# Patient Record
Sex: FEMALE | Race: WHITE | NOT HISPANIC OR LATINO | URBAN - METROPOLITAN AREA
[De-identification: names, ages, dates, MRNs, and addresses within clinical notes are randomized per-mention and may not be internally consistent; named-entity substitution may affect disease eponyms.]

---

## 2017-11-15 ENCOUNTER — EMERGENCY (EMERGENCY)
Facility: HOSPITAL | Age: 57
LOS: 1 days | Discharge: HOME | End: 2017-11-15
Attending: EMERGENCY MEDICINE | Admitting: EMERGENCY MEDICINE
Payer: SELF-PAY

## 2017-11-15 VITALS
WEIGHT: 149.91 LBS | OXYGEN SATURATION: 100 % | DIASTOLIC BLOOD PRESSURE: 106 MMHG | HEIGHT: 59 IN | HEART RATE: 90 BPM | TEMPERATURE: 97 F | SYSTOLIC BLOOD PRESSURE: 166 MMHG | RESPIRATION RATE: 16 BRPM

## 2017-11-15 DIAGNOSIS — N93.9 ABNORMAL UTERINE AND VAGINAL BLEEDING, UNSPECIFIED: ICD-10-CM

## 2017-11-15 DIAGNOSIS — Z90.710 ACQUIRED ABSENCE OF BOTH CERVIX AND UTERUS: Chronic | ICD-10-CM

## 2017-11-15 LAB
ALBUMIN SERPL ELPH-MCNC: 4.1 G/DL — SIGNIFICANT CHANGE UP (ref 3.3–5.2)
ALP SERPL-CCNC: 88 U/L — SIGNIFICANT CHANGE UP (ref 40–120)
ALT FLD-CCNC: 42 U/L — HIGH
ANION GAP SERPL CALC-SCNC: 13 MMOL/L — SIGNIFICANT CHANGE UP (ref 5–17)
APPEARANCE UR: CLEAR — SIGNIFICANT CHANGE UP
AST SERPL-CCNC: 36 U/L — HIGH
BACTERIA # UR AUTO: ABNORMAL
BASOPHILS # BLD AUTO: 0.1 K/UL — SIGNIFICANT CHANGE UP (ref 0–0.2)
BASOPHILS NFR BLD AUTO: 1 % — SIGNIFICANT CHANGE UP (ref 0–2)
BILIRUB SERPL-MCNC: 0.5 MG/DL — SIGNIFICANT CHANGE UP (ref 0.4–2)
BILIRUB UR-MCNC: NEGATIVE — SIGNIFICANT CHANGE UP
BUN SERPL-MCNC: 15 MG/DL — SIGNIFICANT CHANGE UP (ref 8–20)
CALCIUM SERPL-MCNC: 9.4 MG/DL — SIGNIFICANT CHANGE UP (ref 8.6–10.2)
CHLORIDE SERPL-SCNC: 101 MMOL/L — SIGNIFICANT CHANGE UP (ref 98–107)
CO2 SERPL-SCNC: 24 MMOL/L — SIGNIFICANT CHANGE UP (ref 22–29)
COLOR SPEC: YELLOW — SIGNIFICANT CHANGE UP
CREAT SERPL-MCNC: 0.73 MG/DL — SIGNIFICANT CHANGE UP (ref 0.5–1.3)
DIFF PNL FLD: ABNORMAL
EOSINOPHIL # BLD AUTO: 0.4 K/UL — SIGNIFICANT CHANGE UP (ref 0–0.5)
EOSINOPHIL NFR BLD AUTO: 6.5 % — HIGH (ref 0–6)
EPI CELLS # UR: SIGNIFICANT CHANGE UP
GLUCOSE SERPL-MCNC: 105 MG/DL — SIGNIFICANT CHANGE UP (ref 70–115)
GLUCOSE UR QL: NEGATIVE MG/DL — SIGNIFICANT CHANGE UP
HCT VFR BLD CALC: 41.2 % — SIGNIFICANT CHANGE UP (ref 37–47)
HGB BLD-MCNC: 13.6 G/DL — SIGNIFICANT CHANGE UP (ref 12–16)
KETONES UR-MCNC: ABNORMAL
LEUKOCYTE ESTERASE UR-ACNC: ABNORMAL
LYMPHOCYTES # BLD AUTO: 1.8 K/UL — SIGNIFICANT CHANGE UP (ref 1–4.8)
LYMPHOCYTES # BLD AUTO: 28.2 % — SIGNIFICANT CHANGE UP (ref 20–55)
MCHC RBC-ENTMCNC: 30.2 PG — SIGNIFICANT CHANGE UP (ref 27–31)
MCHC RBC-ENTMCNC: 33 G/DL — SIGNIFICANT CHANGE UP (ref 32–36)
MCV RBC AUTO: 91.4 FL — SIGNIFICANT CHANGE UP (ref 81–99)
MONOCYTES # BLD AUTO: 0.8 K/UL — SIGNIFICANT CHANGE UP (ref 0–0.8)
MONOCYTES NFR BLD AUTO: 13.2 % — HIGH (ref 3–10)
NEUTROPHILS # BLD AUTO: 3.2 K/UL — SIGNIFICANT CHANGE UP (ref 1.8–8)
NEUTROPHILS NFR BLD AUTO: 50.9 % — SIGNIFICANT CHANGE UP (ref 37–73)
NITRITE UR-MCNC: NEGATIVE — SIGNIFICANT CHANGE UP
PH UR: 5 — SIGNIFICANT CHANGE UP (ref 5–8)
PLATELET # BLD AUTO: 249 K/UL — SIGNIFICANT CHANGE UP (ref 150–400)
POTASSIUM SERPL-MCNC: 4.1 MMOL/L — SIGNIFICANT CHANGE UP (ref 3.5–5.3)
POTASSIUM SERPL-SCNC: 4.1 MMOL/L — SIGNIFICANT CHANGE UP (ref 3.5–5.3)
PROT SERPL-MCNC: 7.9 G/DL — SIGNIFICANT CHANGE UP (ref 6.6–8.7)
PROT UR-MCNC: 30 MG/DL
RBC # BLD: 4.51 M/UL — SIGNIFICANT CHANGE UP (ref 4.4–5.2)
RBC # FLD: 12.9 % — SIGNIFICANT CHANGE UP (ref 11–15.6)
SODIUM SERPL-SCNC: 138 MMOL/L — SIGNIFICANT CHANGE UP (ref 135–145)
SP GR SPEC: 1.02 — SIGNIFICANT CHANGE UP (ref 1.01–1.02)
UROBILINOGEN FLD QL: 1 MG/DL
WBC # BLD: 6.3 K/UL — SIGNIFICANT CHANGE UP (ref 4.8–10.8)
WBC # FLD AUTO: 6.3 K/UL — SIGNIFICANT CHANGE UP (ref 4.8–10.8)
WBC UR QL: SIGNIFICANT CHANGE UP

## 2017-11-15 PROCEDURE — 80053 COMPREHEN METABOLIC PANEL: CPT

## 2017-11-15 PROCEDURE — 36415 COLL VENOUS BLD VENIPUNCTURE: CPT

## 2017-11-15 PROCEDURE — 81001 URINALYSIS AUTO W/SCOPE: CPT

## 2017-11-15 PROCEDURE — 99284 EMERGENCY DEPT VISIT MOD MDM: CPT

## 2017-11-15 PROCEDURE — 85027 COMPLETE CBC AUTOMATED: CPT

## 2017-11-15 RX ORDER — SODIUM CHLORIDE 9 MG/ML
3 INJECTION INTRAMUSCULAR; INTRAVENOUS; SUBCUTANEOUS ONCE
Qty: 0 | Refills: 0 | Status: COMPLETED | OUTPATIENT
Start: 2017-11-15 | End: 2017-11-15

## 2017-11-15 RX ADMIN — SODIUM CHLORIDE 3 MILLILITER(S): 9 INJECTION INTRAMUSCULAR; INTRAVENOUS; SUBCUTANEOUS at 10:19

## 2017-11-15 RX ADMIN — Medication 0.1 MILLIGRAM(S): at 10:18

## 2017-11-15 NOTE — CONSULT NOTE ADULT - ASSESSMENT
A 56y  LMP ; who presents with a chief complaint of vaginal spotting after sexual intercourse. Patient not actively bleeding

## 2017-11-15 NOTE — ED ADULT NURSE NOTE - OBJECTIVE STATEMENT
pt c/o vag bleeding after intercourse a few days ago bleeding resolved today also c/o lower abd pain x 1 yr. pt states she had a hysterectomy 5 yrs ago

## 2017-11-15 NOTE — ED STATDOCS - MEDICAL DECISION MAKING DETAILS
Pt will need a vaginal exam to look for cervical lesions vs. vaginal tear. UA, CBC, CMP, and Clonidine will be given for high BP.

## 2017-11-15 NOTE — ED STATDOCS - OBJECTIVE STATEMENT
56 year old female presenting to the ED complaining of vaginal bleeding, lower abdominal pain, wheezing, and HTN. Pt states that she is s/p hysterectomy 5 years ago. She states that her vaginal bleeding was light and onset after painful intercourse that last for a few days. Pt states that she visited an urgent care center for her vaginal bleeding and was found to have lower abdominal pain, HTN, and wheezing. She states that she last saw her GYN shortly after her hysterectomy. She states that she has no hx of DM. Pt states that she drinks alcohol and smokes 0.5 ppd. She states that she is allergic to Penicillin. No further complaints at this time.

## 2017-11-15 NOTE — ED STATDOCS - ATTENDING CONTRIBUTION TO CARE
I, Kulwant Sotelo, performed the initial face to face bedside interview with this patient regarding history of present illness, review of symptoms and relevant past medical, social and family history.  I completed an independent physical examination.  I was the initial provider who evaluated this patient. I have signed out the follow up of any pending tests (i.e. labs, radiological studies) to the ACP.  I have communicated the patient’s plan of care and disposition with the ACP.

## 2017-11-15 NOTE — CONSULT NOTE ADULT - SUBJECTIVE AND OBJECTIVE BOX
JUAN HOANG  A 56y  LMP ; who presents with a chief complaint of vaginal spotting after sexual intercourse on . This is a new sexual partner after she has not been sexually active for a number of years. Patient had a hysterectomy 5 years ago and did not see a gyn since then. She has been having pelvic pain since before the hysterectomy that has not resolved. The bleeding was not heavy and has stopped since. She denies any additional symptoms.     PAST MEDICAL & SURGICAL HISTORY:  No pertinent past medical history  S/P hysterectomy    Allergies: penicillin (Rash)    POB/GYN Hx: s/p hysterectomy 5 years ago     Vital Signs:  Vital Signs Last 24 Hrs  T(C): 36.3 (15 Nov 2017 09:12), Max: 36.3 (15 Nov 2017 09:12)  T(F): 97.4 (15 Nov 2017 09:12), Max: 97.4 (15 Nov 2017 09:12)  HR: 90 (15 Nov 2017 09:12) (90 - 90)  BP: 166/106 (15 Nov 2017 09:12) (166/106 - 166/106)  RR: 16 (15 Nov 2017 09:12) (16 - 16)  SpO2: 100% (15 Nov 2017 09:12) (100% - 100%)    Physical Exam:  General: NAD  Abdomen: ND, soft, NT.  Pelvic: Normal external genitalia no lesions in vaginal canal, no pooling in vaginal vault, no bleeding, 5mm circular abrasion on cervix at 9 o'clock, not actively bleeding, os is closed, no discharge from cervical os. Negative CMT, negative adnexal tenderness.    Labs:                13.6   6.3   )-----------( 249      ( 15 Nov 2017 10:17 )             41.2

## 2017-11-15 NOTE — ED STATDOCS - PROGRESS NOTE DETAILS
Pelvic exam done: abrasion to cervix, no active bleeding, no CMT, no adenexal tenderness, no lesions. Patient re-evaluated c/o DUB s/p hysterectomy x 5 years ago by [facility], had intercourse with new partner, denies any h/o STD.  Abd soft NT ND.  Patient is active smoker. GYN called for evaluation, saw patient and discussed with patient would like f/u as outpatient

## 2017-11-15 NOTE — ED ADULT TRIAGE NOTE - CHIEF COMPLAINT QUOTE
pt presents to Ed with vaginal bleeding x few days with lower abd pain. pt c.o SOB at times x few months. pt c/o wheezing. breathing si even and unlabored. pt also c/o lower back pain x over one year. pt ambulates without difficulty. afebrile.

## 2018-01-25 ENCOUNTER — EMERGENCY (EMERGENCY)
Facility: HOSPITAL | Age: 58
LOS: 1 days | Discharge: HOME | End: 2018-01-25
Attending: EMERGENCY MEDICINE | Admitting: EMERGENCY MEDICINE
Payer: SELF-PAY

## 2018-01-25 VITALS
HEIGHT: 59 IN | HEART RATE: 85 BPM | DIASTOLIC BLOOD PRESSURE: 98 MMHG | RESPIRATION RATE: 20 BRPM | SYSTOLIC BLOOD PRESSURE: 159 MMHG | OXYGEN SATURATION: 98 % | WEIGHT: 149.91 LBS | TEMPERATURE: 98 F

## 2018-01-25 DIAGNOSIS — Z90.710 ACQUIRED ABSENCE OF BOTH CERVIX AND UTERUS: Chronic | ICD-10-CM

## 2018-01-25 PROCEDURE — 96372 THER/PROPH/DIAG INJ SC/IM: CPT

## 2018-01-25 PROCEDURE — 99284 EMERGENCY DEPT VISIT MOD MDM: CPT

## 2018-01-25 PROCEDURE — 71101 X-RAY EXAM UNILAT RIBS/CHEST: CPT | Mod: 26

## 2018-01-25 PROCEDURE — 71101 X-RAY EXAM UNILAT RIBS/CHEST: CPT

## 2018-01-25 PROCEDURE — 99283 EMERGENCY DEPT VISIT LOW MDM: CPT | Mod: 25

## 2018-01-25 RX ORDER — TRAMADOL HYDROCHLORIDE 50 MG/1
1 TABLET ORAL
Qty: 12 | Refills: 0
Start: 2018-01-25 | End: 2018-01-27

## 2018-01-25 RX ORDER — METHOCARBAMOL 500 MG/1
2 TABLET, FILM COATED ORAL
Qty: 30 | Refills: 0
Start: 2018-01-25 | End: 2018-01-29

## 2018-01-25 RX ORDER — METHOCARBAMOL 500 MG/1
1500 TABLET, FILM COATED ORAL ONCE
Qty: 0 | Refills: 0 | Status: COMPLETED | OUTPATIENT
Start: 2018-01-25 | End: 2018-01-25

## 2018-01-25 RX ORDER — FAMOTIDINE 10 MG/ML
1 INJECTION INTRAVENOUS
Qty: 30 | Refills: 0
Start: 2018-01-25 | End: 2018-02-23

## 2018-01-25 RX ORDER — IBUPROFEN 200 MG
1 TABLET ORAL
Qty: 21 | Refills: 0
Start: 2018-01-25 | End: 2018-01-31

## 2018-01-25 RX ORDER — KETOROLAC TROMETHAMINE 30 MG/ML
60 SYRINGE (ML) INJECTION ONCE
Qty: 0 | Refills: 0 | Status: DISCONTINUED | OUTPATIENT
Start: 2018-01-25 | End: 2018-01-25

## 2018-01-25 RX ADMIN — METHOCARBAMOL 1500 MILLIGRAM(S): 500 TABLET, FILM COATED ORAL at 16:01

## 2018-01-25 RX ADMIN — Medication 60 MILLIGRAM(S): at 16:01

## 2018-01-25 NOTE — ED ADULT NURSE NOTE - OBJECTIVE STATEMENT
Pt axox3 c/o new onset of left sided rib/ chest pain that started last night. Pt denies any recent falls, heavy lifting or injury and is also c/o pain with inhalation. Pt denies sob, and at this time Is limited in mobility due to discomfort.

## 2018-01-25 NOTE — ED PROVIDER NOTE - ATTENDING CONTRIBUTION TO CARE
seen with acp  c/o localized left lower rib pain no hx of trauma no sob no nausea no vomiitng  PE localized tender left lower ribs chest xlear heart regular rrhythm  abd soft ext no cce  x-ray shows fracture ribs  Agree with ACPS, assessment hx and physical

## 2018-01-25 NOTE — ED PROVIDER NOTE - CARE PLAN
Principal Discharge DX:	Musculoskeletal pain  Secondary Diagnosis:	Hiatal hernia Principal Discharge DX:	Rib fracture  Secondary Diagnosis:	Hiatal hernia

## 2018-01-25 NOTE — ED PROVIDER NOTE - PROGRESS NOTE DETAILS
Hiatal hernia noted on CXR. No rib fx. Will dc home with pepcid and GI f/u and will also tx for msk pain

## 2018-01-25 NOTE — ED ADULT TRIAGE NOTE - CHIEF COMPLAINT QUOTE
pt c/o pain to under ribs on left side, pt denies injury, reports woke in pain, pain is with movement and palpation, pt reports a "fizzy ' feeling on right side under ribs but not pain, pt denies injury, breathing even and unlabored

## 2018-01-25 NOTE — ED PROVIDER NOTE - OBJECTIVE STATEMENT
58 yo F presented to ED to acute onset of left sided rib pain upon waking. Pt denies any trauma. Pain worse with movement. No CP or SOB. NO cough or sob. No N/V. NO rashes. 56 yo F presented to ED to acute onset of left sided rib pain upon waking. Pt denies any trauma. Pain worse with movement. No CP or SOB. NO cough or sob. No N/V. NO rashes. Pt also reports gurgling sensation in stomach.

## 2018-09-13 ENCOUNTER — EMERGENCY (EMERGENCY)
Facility: HOSPITAL | Age: 58
LOS: 1 days | Discharge: LEFT BEFORE DISCHARGE | End: 2018-09-13
Attending: EMERGENCY MEDICINE

## 2018-09-13 VITALS
RESPIRATION RATE: 18 BRPM | SYSTOLIC BLOOD PRESSURE: 144 MMHG | HEART RATE: 86 BPM | OXYGEN SATURATION: 95 % | DIASTOLIC BLOOD PRESSURE: 92 MMHG | TEMPERATURE: 98 F

## 2018-09-13 DIAGNOSIS — Z90.710 ACQUIRED ABSENCE OF BOTH CERVIX AND UTERUS: Chronic | ICD-10-CM

## 2019-08-08 NOTE — ED ADULT TRIAGE NOTE - NS ED NURSE BANDS TYPE
(gastroesophageal reflux disease)     Hiatal hernia     Hyperlipidemia     Hypertension     Obesity (BMI 30.0-34. 9)     YUMIKO on CPAP     PONV (postoperative nausea and vomiting)     nauseated after last surgery    Unspecified sleep apnea     cpap nightly       Family History:       Problem Relation Age of Onset    Heart Disease Mother        SURGICAL HISTORY:   Past Surgical History:   Procedure Laterality Date    COLONOSCOPY      ENDOSCOPY, COLON, DIAGNOSTIC      HERNIA REPAIR  2010    DC DESTR PENIS LESN,SIMPL,SURG EXCIS N/A 7/31/2018    SUPRA PUBIC LESIONS BIOPSY EXCISION performed by Eugenia Kincaid MD at [address] [place] [address] Bilateral 07/31/2018    SUPRA PUBIC LESIONS BIOPSY EXCISION (N/A )    THROAT SURGERY  2008    TUMOR REMOVAL Right 09/23/2016    [facility]  right thigh   (benign)              Not in a hospital admission. No Known Allergies  Social History     Tobacco Use   Smoking Status Never Smoker   Smokeless Tobacco Never Used     Prior to Admission medications    Medication Sig Start Date End Date Taking? Authorizing Provider   amLODIPine (NORVASC) 10 MG tablet TAKE ONE TABLET BY MOUTH EVERY DAY 7/2/19  Yes Edna Black MD   pravastatin (PRAVACHOL) 40 MG tablet TAKE ONE TABLET BY MOUTH EVERY DAY 6/5/19  Yes Edna Black MD   valsartan-hydrochlorothiazide (DIOVAN-HCT) 160-12.5 MG per tablet TAKE ONE TABLET BY MOUTH EVERY DAY 6/5/19  Yes Edna Black MD   potassium chloride (KLOR-CON M) 20 MEQ extended release tablet TAKE ONE TABLET BY MOUTH EVERY DAY. 6/5/19  Yes Edna Black MD   Fexofenadine-Pseudoephedrine (ALLEGRA-D 24 HOUR PO) Take 1 tablet by mouth daily as needed   Yes Historical Provider, MD         Physical Exam  General Appearance:    Alert, cooperative, no distress, appears stated age, Obesity [place]. Distress, very cooperative    Head:    Normocephalic, without obvious abnormality, atraumatic   Allergies no polyps. No sinus tenderness noted.     Throat examination is Name band;

## 2020-06-24 ENCOUNTER — TRANSCRIPTION ENCOUNTER (OUTPATIENT)
Age: 60
End: 2020-06-24

## 2020-07-08 ENCOUNTER — TRANSCRIPTION ENCOUNTER (OUTPATIENT)
Age: 60
End: 2020-07-08

## 2020-12-15 NOTE — ED PROVIDER NOTE - CPE EDP RESP NORM
What Type Of Note Output Would You Prefer (Optional)?: Bullet Format Hpi Title: Evaluation of Skin Lesions Have Your Spot(S) Been Treated In The Past?: has not been treated normal...

## 2021-11-27 ENCOUNTER — INPATIENT (INPATIENT)
Facility: HOSPITAL | Age: 61
LOS: 1 days | Discharge: HOME | DRG: 310 | End: 2021-11-29
Attending: HOSPITALIST | Admitting: HOSPITALIST
Payer: MEDICAID

## 2021-11-27 VITALS
OXYGEN SATURATION: 95 % | DIASTOLIC BLOOD PRESSURE: 83 MMHG | HEART RATE: 114 BPM | RESPIRATION RATE: 18 BRPM | TEMPERATURE: 98 F | SYSTOLIC BLOOD PRESSURE: 125 MMHG | WEIGHT: 139.99 LBS | HEIGHT: 59 IN

## 2021-11-27 DIAGNOSIS — Z90.710 ACQUIRED ABSENCE OF BOTH CERVIX AND UTERUS: Chronic | ICD-10-CM

## 2021-11-27 LAB
ALBUMIN SERPL ELPH-MCNC: 3.8 G/DL — SIGNIFICANT CHANGE UP (ref 3.3–5.2)
ALP SERPL-CCNC: 75 U/L — SIGNIFICANT CHANGE UP (ref 40–120)
ALT FLD-CCNC: 19 U/L — SIGNIFICANT CHANGE UP
ANION GAP SERPL CALC-SCNC: 13 MMOL/L — SIGNIFICANT CHANGE UP (ref 5–17)
APTT BLD: 30.6 SEC — SIGNIFICANT CHANGE UP (ref 27.5–35.5)
AST SERPL-CCNC: 29 U/L — SIGNIFICANT CHANGE UP
BASOPHILS # BLD AUTO: 0.05 K/UL — SIGNIFICANT CHANGE UP (ref 0–0.2)
BASOPHILS NFR BLD AUTO: 0.7 % — SIGNIFICANT CHANGE UP (ref 0–2)
BILIRUB SERPL-MCNC: 0.2 MG/DL — LOW (ref 0.4–2)
BUN SERPL-MCNC: 15.1 MG/DL — SIGNIFICANT CHANGE UP (ref 8–20)
CALCIUM SERPL-MCNC: 9 MG/DL — SIGNIFICANT CHANGE UP (ref 8.6–10.2)
CHLORIDE SERPL-SCNC: 105 MMOL/L — SIGNIFICANT CHANGE UP (ref 98–107)
CO2 SERPL-SCNC: 21 MMOL/L — LOW (ref 22–29)
CREAT SERPL-MCNC: 0.83 MG/DL — SIGNIFICANT CHANGE UP (ref 0.5–1.3)
D DIMER BLD IA.RAPID-MCNC: <150 NG/ML DDU — SIGNIFICANT CHANGE UP
EOSINOPHIL # BLD AUTO: 0.4 K/UL — SIGNIFICANT CHANGE UP (ref 0–0.5)
EOSINOPHIL NFR BLD AUTO: 5.5 % — SIGNIFICANT CHANGE UP (ref 0–6)
GLUCOSE SERPL-MCNC: 83 MG/DL — SIGNIFICANT CHANGE UP (ref 70–99)
HCT VFR BLD CALC: 39.6 % — SIGNIFICANT CHANGE UP (ref 34.5–45)
HGB BLD-MCNC: 13.4 G/DL — SIGNIFICANT CHANGE UP (ref 11.5–15.5)
HIV 1 & 2 AB SERPL IA.RAPID: SIGNIFICANT CHANGE UP
IMM GRANULOCYTES NFR BLD AUTO: 0.3 % — SIGNIFICANT CHANGE UP (ref 0–1.5)
INR BLD: 1.01 RATIO — SIGNIFICANT CHANGE UP (ref 0.88–1.16)
LYMPHOCYTES # BLD AUTO: 1.74 K/UL — SIGNIFICANT CHANGE UP (ref 1–3.3)
LYMPHOCYTES # BLD AUTO: 23.8 % — SIGNIFICANT CHANGE UP (ref 13–44)
MAGNESIUM SERPL-MCNC: 1.9 MG/DL — SIGNIFICANT CHANGE UP (ref 1.6–2.6)
MCHC RBC-ENTMCNC: 29.8 PG — SIGNIFICANT CHANGE UP (ref 27–34)
MCHC RBC-ENTMCNC: 33.8 GM/DL — SIGNIFICANT CHANGE UP (ref 32–36)
MCV RBC AUTO: 88.2 FL — SIGNIFICANT CHANGE UP (ref 80–100)
MONOCYTES # BLD AUTO: 0.83 K/UL — SIGNIFICANT CHANGE UP (ref 0–0.9)
MONOCYTES NFR BLD AUTO: 11.3 % — SIGNIFICANT CHANGE UP (ref 2–14)
NEUTROPHILS # BLD AUTO: 4.28 K/UL — SIGNIFICANT CHANGE UP (ref 1.8–7.4)
NEUTROPHILS NFR BLD AUTO: 58.4 % — SIGNIFICANT CHANGE UP (ref 43–77)
NT-PROBNP SERPL-SCNC: 232 PG/ML — SIGNIFICANT CHANGE UP (ref 0–300)
PLATELET # BLD AUTO: 197 K/UL — SIGNIFICANT CHANGE UP (ref 150–400)
POTASSIUM SERPL-MCNC: 4.3 MMOL/L — SIGNIFICANT CHANGE UP (ref 3.5–5.3)
POTASSIUM SERPL-SCNC: 4.3 MMOL/L — SIGNIFICANT CHANGE UP (ref 3.5–5.3)
PROT SERPL-MCNC: 7.8 G/DL — SIGNIFICANT CHANGE UP (ref 6.6–8.7)
PROTHROM AB SERPL-ACNC: 11.7 SEC — SIGNIFICANT CHANGE UP (ref 10.6–13.6)
RBC # BLD: 4.49 M/UL — SIGNIFICANT CHANGE UP (ref 3.8–5.2)
RBC # FLD: 12.7 % — SIGNIFICANT CHANGE UP (ref 10.3–14.5)
SODIUM SERPL-SCNC: 139 MMOL/L — SIGNIFICANT CHANGE UP (ref 135–145)
T4 AB SER-ACNC: 4.5 UG/DL — SIGNIFICANT CHANGE UP (ref 4.5–12)
TROPONIN T SERPL-MCNC: <0.01 NG/ML — SIGNIFICANT CHANGE UP (ref 0–0.06)
TSH SERPL-MCNC: 5.53 UIU/ML — HIGH (ref 0.27–4.2)
WBC # BLD: 7.32 K/UL — SIGNIFICANT CHANGE UP (ref 3.8–10.5)
WBC # FLD AUTO: 7.32 K/UL — SIGNIFICANT CHANGE UP (ref 3.8–10.5)

## 2021-11-27 PROCEDURE — 99285 EMERGENCY DEPT VISIT HI MDM: CPT | Mod: 25

## 2021-11-27 PROCEDURE — 71045 X-RAY EXAM CHEST 1 VIEW: CPT | Mod: 26

## 2021-11-27 PROCEDURE — 93010 ELECTROCARDIOGRAM REPORT: CPT | Mod: 76

## 2021-11-27 RX ORDER — DILTIAZEM HCL 120 MG
10 CAPSULE, EXT RELEASE 24 HR ORAL ONCE
Refills: 0 | Status: COMPLETED | OUTPATIENT
Start: 2021-11-27 | End: 2021-11-27

## 2021-11-27 RX ORDER — DILTIAZEM HCL 120 MG
30 CAPSULE, EXT RELEASE 24 HR ORAL ONCE
Refills: 0 | Status: COMPLETED | OUTPATIENT
Start: 2021-11-27 | End: 2021-11-27

## 2021-11-27 RX ORDER — ASPIRIN/CALCIUM CARB/MAGNESIUM 324 MG
324 TABLET ORAL ONCE
Refills: 0 | Status: COMPLETED | OUTPATIENT
Start: 2021-11-27 | End: 2021-11-27

## 2021-11-27 RX ADMIN — Medication 10 MILLIGRAM(S): at 23:59

## 2021-11-27 NOTE — ED ADULT NURSE NOTE - OBJECTIVE STATEMENT
pt with reports of palpitations and chest pressure that began yesterday. pt states she has been PAPPAS as well x a few weeks. denies cough, denies any other complaints. pt reports she has been smoking a lot more cigarettes recently the past few weeks as well. pt AOX3, no extremity edema noted. pt with no fevers, no recent illness.

## 2021-11-27 NOTE — ED PROVIDER NOTE - CLINICAL SUMMARY MEDICAL DECISION MAKING FREE TEXT BOX
labs and imaging reviewed.  Pt with palpitations and chest tightness.  initial EKG was mild ST depressions diffusely but resolved when she was less anxious and rate improved.  dimer neg.  Pt signed out to Dr. Murphy pending rpt trop and if neg plan to d/c with outpatient cards f/up. labs and imaging reviewed.  Pt with palpitations and chest tightness.    dimer neg. Pt had an episode of paroxysmal AF in ED.  Pt given IV and po cardizem. Pt signed out to Dr. Murphy monitoring and if HR stays stable plan for obs for cards consult/tte.

## 2021-11-27 NOTE — ED PROVIDER NOTE - CARE PROVIDER_API CALL
Garo Caro)  Cardiovascular Disease  [address] [facility], [address] [place] [address]  Phone: [phone]  Fax: [phone]  Follow Up Time: 1-3 Days

## 2021-11-27 NOTE — ED PROVIDER NOTE - OBJECTIVE STATEMENT
Pt is a 59 yo F co palpitations.  Pt states that last night she had onset of palpitations with some chest tightness.  Pt states that she has also had shortness of breath for the past month. Pt states that it has been constant but only occurs when she exerts herself.  Pt states that the palpitations and tightness went away overnight but came back again this evening. no n/v. no fever/chills. no cough. Pt is a 59 yo F co palpitations.  Pt states that last night she had onset of palpitations with some chest tightness.  Pt states that she has also had shortness of breath for the past month. Pt states that it has been constant but only occurs when she walks around but states that she has been smoking much heavier for the past couple of months. Pt states that the palpitations and tightness went away overnight but came back again this evening. no n/v. no fever/chills. no cough.

## 2021-11-27 NOTE — ED PROVIDER NOTE - NS ED ROS FT
No fever/chills, No photophobia/eye pain/changes in vision, No ear pain/sore throat/dysphagia, + chest pain/palpitations, + SOB no cough/wheeze/stridor, No abdominal pain, No N/V/D, no dysuria/frequency/discharge, No neck/back pain, no rash, no changes in neurological status/function.

## 2021-11-28 DIAGNOSIS — E03.9 HYPOTHYROIDISM, UNSPECIFIED: ICD-10-CM

## 2021-11-28 DIAGNOSIS — F17.200 NICOTINE DEPENDENCE, UNSPECIFIED, UNCOMPLICATED: ICD-10-CM

## 2021-11-28 DIAGNOSIS — S82.202A UNSPECIFIED FRACTURE OF SHAFT OF LEFT TIBIA, INITIAL ENCOUNTER FOR CLOSED FRACTURE: Chronic | ICD-10-CM

## 2021-11-28 DIAGNOSIS — I48.0 PAROXYSMAL ATRIAL FIBRILLATION: ICD-10-CM

## 2021-11-28 DIAGNOSIS — I48.91 UNSPECIFIED ATRIAL FIBRILLATION: ICD-10-CM

## 2021-11-28 DIAGNOSIS — F41.9 ANXIETY DISORDER, UNSPECIFIED: ICD-10-CM

## 2021-11-28 LAB
SARS-COV-2 RNA SPEC QL NAA+PROBE: SIGNIFICANT CHANGE UP
TROPONIN T SERPL-MCNC: <0.01 NG/ML — SIGNIFICANT CHANGE UP (ref 0–0.06)
TROPONIN T SERPL-MCNC: <0.01 NG/ML — SIGNIFICANT CHANGE UP (ref 0–0.06)

## 2021-11-28 PROCEDURE — 99236 HOSP IP/OBS SAME DATE HI 85: CPT | Mod: 25

## 2021-11-28 PROCEDURE — 99223 1ST HOSP IP/OBS HIGH 75: CPT

## 2021-11-28 PROCEDURE — 93010 ELECTROCARDIOGRAM REPORT: CPT

## 2021-11-28 PROCEDURE — 99222 1ST HOSP IP/OBS MODERATE 55: CPT

## 2021-11-28 PROCEDURE — 71275 CT ANGIOGRAPHY CHEST: CPT | Mod: 26,MA

## 2021-11-28 RX ORDER — ALBUTEROL 90 UG/1
2.5 AEROSOL, METERED ORAL EVERY 6 HOURS
Refills: 0 | Status: DISCONTINUED | OUTPATIENT
Start: 2021-11-28 | End: 2021-11-29

## 2021-11-28 RX ORDER — SERTRALINE 25 MG/1
1 TABLET, FILM COATED ORAL
Qty: 0 | Refills: 0 | DISCHARGE

## 2021-11-28 RX ORDER — SERTRALINE 25 MG/1
25 TABLET, FILM COATED ORAL DAILY
Refills: 0 | Status: DISCONTINUED | OUTPATIENT
Start: 2021-11-28 | End: 2021-11-29

## 2021-11-28 RX ORDER — DILTIAZEM HCL 120 MG
30 CAPSULE, EXT RELEASE 24 HR ORAL EVERY 6 HOURS
Refills: 0 | Status: DISCONTINUED | OUTPATIENT
Start: 2021-11-28 | End: 2021-11-28

## 2021-11-28 RX ORDER — SODIUM CHLORIDE 9 MG/ML
1000 INJECTION, SOLUTION INTRAVENOUS
Refills: 0 | Status: DISCONTINUED | OUTPATIENT
Start: 2021-11-28 | End: 2021-11-29

## 2021-11-28 RX ORDER — ASPIRIN/CALCIUM CARB/MAGNESIUM 324 MG
81 TABLET ORAL DAILY
Refills: 0 | Status: DISCONTINUED | OUTPATIENT
Start: 2021-11-28 | End: 2021-11-29

## 2021-11-28 RX ORDER — INFLUENZA VIRUS VACCINE 15; 15; 15; 15 UG/.5ML; UG/.5ML; UG/.5ML; UG/.5ML
0.5 SUSPENSION INTRAMUSCULAR ONCE
Refills: 0 | Status: DISCONTINUED | OUTPATIENT
Start: 2021-11-28 | End: 2021-11-29

## 2021-11-28 RX ORDER — DIVALPROEX SODIUM 500 MG/1
1 TABLET, DELAYED RELEASE ORAL
Qty: 0 | Refills: 0 | DISCHARGE

## 2021-11-28 RX ORDER — LEVOTHYROXINE SODIUM 125 MCG
1 TABLET ORAL
Qty: 0 | Refills: 0 | DISCHARGE

## 2021-11-28 RX ORDER — DIVALPROEX SODIUM 500 MG/1
250 TABLET, DELAYED RELEASE ORAL EVERY 12 HOURS
Refills: 0 | Status: DISCONTINUED | OUTPATIENT
Start: 2021-11-28 | End: 2021-11-29

## 2021-11-28 RX ORDER — MAGNESIUM SULFATE 500 MG/ML
1 VIAL (ML) INJECTION ONCE
Refills: 0 | Status: COMPLETED | OUTPATIENT
Start: 2021-11-28 | End: 2021-11-28

## 2021-11-28 RX ORDER — LEVOTHYROXINE SODIUM 125 MCG
25 TABLET ORAL DAILY
Refills: 0 | Status: DISCONTINUED | OUTPATIENT
Start: 2021-11-28 | End: 2021-11-29

## 2021-11-28 RX ORDER — BUDESONIDE AND FORMOTEROL FUMARATE DIHYDRATE 160; 4.5 UG/1; UG/1
2 AEROSOL RESPIRATORY (INHALATION)
Refills: 0 | Status: DISCONTINUED | OUTPATIENT
Start: 2021-11-28 | End: 2021-11-29

## 2021-11-28 RX ORDER — QUETIAPINE FUMARATE 200 MG/1
200 TABLET, FILM COATED ORAL AT BEDTIME
Refills: 0 | Status: DISCONTINUED | OUTPATIENT
Start: 2021-11-28 | End: 2021-11-29

## 2021-11-28 RX ORDER — QUETIAPINE FUMARATE 200 MG/1
1 TABLET, FILM COATED ORAL
Qty: 0 | Refills: 0 | DISCHARGE

## 2021-11-28 RX ORDER — DILTIAZEM HCL 120 MG
60 CAPSULE, EXT RELEASE 24 HR ORAL EVERY 6 HOURS
Refills: 0 | Status: DISCONTINUED | OUTPATIENT
Start: 2021-11-28 | End: 2021-11-29

## 2021-11-28 RX ADMIN — Medication 100 GRAM(S): at 05:09

## 2021-11-28 RX ADMIN — QUETIAPINE FUMARATE 200 MILLIGRAM(S): 200 TABLET, FILM COATED ORAL at 21:04

## 2021-11-28 RX ADMIN — SODIUM CHLORIDE 125 MILLILITER(S): 9 INJECTION, SOLUTION INTRAVENOUS at 04:46

## 2021-11-28 RX ADMIN — DIVALPROEX SODIUM 250 MILLIGRAM(S): 500 TABLET, DELAYED RELEASE ORAL at 17:12

## 2021-11-28 RX ADMIN — Medication 60 MILLIGRAM(S): at 17:35

## 2021-11-28 RX ADMIN — Medication 81 MILLIGRAM(S): at 12:45

## 2021-11-28 RX ADMIN — SERTRALINE 25 MILLIGRAM(S): 25 TABLET, FILM COATED ORAL at 12:45

## 2021-11-28 RX ADMIN — Medication 60 MILLIGRAM(S): at 12:45

## 2021-11-28 RX ADMIN — Medication 30 MILLIGRAM(S): at 06:12

## 2021-11-28 RX ADMIN — Medication 324 MILLIGRAM(S): at 00:29

## 2021-11-28 RX ADMIN — DIVALPROEX SODIUM 250 MILLIGRAM(S): 500 TABLET, DELAYED RELEASE ORAL at 06:13

## 2021-11-28 RX ADMIN — Medication 30 MILLIGRAM(S): at 00:28

## 2021-11-28 RX ADMIN — Medication 1 GRAM(S): at 06:10

## 2021-11-28 NOTE — H&P ADULT - ASSESSMENT
60 year old female smoker with PMH Hypothyroidism, Anxiety with depression presents with palpitations and exertional dyspnea. Noted to have new onset AF with RVR. TSH slightly elevated. CT chest with SUSANNAH nodules.    AF with RVR, CHADSVASC 1  - monitored bed  - Cardizem 60 q6  - TTE  - Cardiology/EP consults appreciated    Smoking with emphysema on CT scan  - Albuterol, Symbicort  - Repeat CT scan in 8 weeks  - Smoking cessation; refused NRT    Depression/Anxety  - Continue home medications; Depakote, Sertraline and Seroquel    Hypothyroidism  - Continue Synthroid 25 for now  - Repeat TSH    VTEp - VCD  Incentive Spirometry

## 2021-11-28 NOTE — ED ADULT NURSE REASSESSMENT NOTE - GENERAL PATIENT STATE
Patient is alert and oriented x 3, breathing freely via room air with no sign of acute distress noted./comfortable appearance/cooperative
Patient is lying on stretcher, resting, but responses to verbal stimuli, breathing freely via room air with no sign of acute distress noted. Patient is in stable condition./comfortable appearance/cooperative
comfortable appearance/cooperative
comfortable appearance/cooperative
cooperative
comfortable appearance/cooperative

## 2021-11-28 NOTE — CONSULT NOTE ADULT - PROBLEM SELECTOR RECOMMENDATION 2
Patient educated on risks of continue tobacco use  - will consider cessation after discharge  - did not ask for Nicotine patch at this time

## 2021-11-28 NOTE — CONSULT NOTE ADULT - NSCONSULTADDITIONALINFOA_GEN_ALL_CORE
Pt seen and examined.  Plan of care BETTY DAVIS.  Pt with palpitations. Paroxysmal afib.  Pt on multiple meds for psychiatric issues.   Advised ED to get CTA chest to r/o pe.  Cont with cardizem  She may need antiarrhythmic therapy vs ablation.  I will ask EP to see pt as well.

## 2021-11-28 NOTE — ED ADULT NURSE REASSESSMENT NOTE - COMFORT CARE
ambulated to bathroom ambulated to bathroom/meal provided/plan of care explained/po fluids offered/repositioned/wait time explained

## 2021-11-28 NOTE — ED CDU PROVIDER SUBSEQUENT DAY NOTE - MEDICAL DECISION MAKING DETAILS
Pt is a 59 yo Female PMh of anxiety , depression co palpitations on and off x 1 month and she is feeling winded.  Pt states that last night she had onset of palpitations with some chest tightness. states she has some chest pain as well on mid chest none radiating x 1 month with new onset of the A fib on mon     given Cardizem Iv and Po covert to NSR   continue Cardizem 30MG PO Q6hrs with parameter   serial trop and EKG x 3 negative   Echo   called [facility]  LILLIAM SCORE IS 1 start the pt on ASA

## 2021-11-28 NOTE — ED ADULT NURSE REASSESSMENT NOTE - REASSESS COMMUNICATION
Report given to GONZÁLEZ Zavala from [facility].
DEVIKA Murphy MD made aware; stating patient is due to go to observation./ED physician notified
ED physician notified

## 2021-11-28 NOTE — ED CDU PROVIDER SUBSEQUENT DAY NOTE - ATTENDING CONTRIBUTION TO CARE
I personally saw the patient with the PA, and completed the key components of the history and physical exam. I then discussed the management plan with the PA.   gen in nad resp clear cardiac no mucmur irregular irregular abd soft neuro intact

## 2021-11-28 NOTE — CONSULT NOTE ADULT - SUBJECTIVE AND OBJECTIVE BOX
[place] [facility]                                                        Office: [address] [address]                                                       Telephone: [phone]. Fax:[phone]                                                              CARDIOLOGY CONSULTATION NOTE                                                                                               History obtained by: Patient and medical record     obtained: No    Chief complaint: i have palpitations     HPI: Patient is a 61yo F c/o palpitations.  Pt states that last night she had onset of palpitations with some chest tightness.  Pt states that she has also had shortness of breath for the past month. Pt states that it has been constant but only occurs when she walks around but states that she has been smoking much heavier for the past couple of months. Pt states that the palpitations and tightness went away overnight but came back again this evening. no n/v. no fever/chills. no cough    REVIEW OF SYMPTOMS:   Cardiovascular:  See HPI. No chest pain,  No dyspnea,  No syncope,  No palpitations, No dizziness, No Orthopnea,      No Paroxsymal nocturnal dyspnea;  Respiratory:  No Dyspnea, No cough,     Genitourinary:  No dysuria, no hematuria; Gastrointestinal:  No nausea, no vomiting. No diarrhea.  No abdominal pain. No dark color stool, no melena ; Neurological: No headache, no dizziness, no slurred speech;  Psychiatric: No agitation, no anxiety.  ALL OTHER REVIEW OF SYSTEMS ARE NEGATIVE.    ALLERGIES: Penicillin (Rash)    CURRENT MEDICATIONS:  diltiazem    Tablet 30 milliGRAM(s) Oral every 6 hours  diVALproex DR  sertraline  aspirin enteric coated  lactated ringers.  magnesium sulfate  IVPB    HOME MEDICATIONS:    PAST MEDICAL HISTORY  Sleeping difficulty  Major depression  Anxiety and depression    PAST SURGICAL HISTORY  S/P hysterectomy    FAMILY HISTORY: FH: CAD (coronary artery disease) (Mother)    SOCIAL HISTORY:  + smoking 0.5 pck for 45years. no alcohol/drugs    Vital Signs Last 24 Hrs  T(C): 36.8 (28 Nov 2021 04:00), Max: 36.8 (28 Nov 2021 04:00)  T(F): 98.2 (28 Nov 2021 04:00), Max: 98.2 (28 Nov 2021 04:00)  HR: 120 (28 Nov 2021 04:00) (79 - 140)  BP: 112/74 (28 Nov 2021 04:00) (106/79 - 125/83)  BP(mean): 88 (28 Nov 2021 00:48) (88 - 88)  RR: 18 (28 Nov 2021 04:00) (18 - 23)  SpO2: 98% (28 Nov 2021 04:00) (95% - 98%)      PHYSICAL EXAM:  Constitutional: Comfortable . No acute distress.   HEENT: Atraumatic and normcephalic , neck is supple . no JVD. No carotid bruit. PEERL   CNS: A&Ox3. No focal deficits. EOMI. Cranial nerves II-IX are intact.   Lymph Nodes: Cervical : Not palpable.  Respiratory: CTAB  Cardiovascular: S1S2 RRR. No murmur/rubs or gallop.  Gastrointestinal: Soft non-tender and non distended . +Bowel sounds. negative Dodd's sign.  Extremities: No edema.   Psychiatric: Calm . no agitation.  Skin: No skin rash/ulcers visualized to face, hands or feet.    Intake and output:     LABS:                        13.4   7.32  )-----------( 197      ( 27 Nov 2021 21:09 )             39.6     11-27    139  |  105  |  15.1  ----------------------------<  83  4.3   |  21.0<L>  |  0.83    Ca    9.0      27 Nov 2021 21:09  Mg     1.9     11-27    TPro  7.8  /  Alb  3.8  /  TBili  0.2<L>  /  DBili  x   /  AST  29  /  ALT  19  /  AlkPhos  75  11-27    CARDIAC MARKERS ( 28 Nov 2021 00:20 )  x     / <0.01 ng/mL / x     / x     / x      CARDIAC MARKERS ( 27 Nov 2021 21:09 )  x     / <0.01 ng/mL / x     / x     / x        ;p-BNP=Serum Pro-Brain Natriuretic Peptide: 232 pg/mL (11-27 @ 21:09)     PT: 11.7 sec;   INR: 1.01 ratio   PTT:30.6 sec    INTERPRETATION OF TELEMETRY: NSR w/ PVCs  ECG: A fib RVR    RADIOLOGY & ADDITIONAL STUDIES:   X-ray:      ECHO FINDINGS: Date:                                                                       [place] [facility]                                                        Office: [address] [address]                                                       Telephone: [phone]. Fax:[phone]                                                              CARDIOLOGY CONSULTATION NOTE                                                                                               History obtained by: Patient and medical record     obtained: No    Chief complaint: i have palpitations     HPI: Patient is a 61yo F c/o palpitations.  Patient reports worsening and more frequent episodes of rapid heart rate and last night she experienced palpitations with some chest tightness as well.  Also has some shortness of breath lately and admits to "smoking much heavier for the past couple of months."  Smokes about half a pack daily for past 45 years, denies ETOH or drug use.  States palpitations come on randomly, sometimes at rest other times while she moves around.  Events last a few seconds up to a minute, and associated with chest tightness.  Denies HA, N/V, diaphoresis, sick contacts, fever/chills. or cough.  Family history of arrhythmia.     REVIEW OF SYMPTOMS:   Cardiovascular: See HPI.  +chest tightness, no dyspnea, no syncope, +palpitations, no dizziness, no Orthopnea,      No Paroxsymal nocturnal dyspnea;    Respiratory: No Dyspnea, No cough,     Genitourinary: No dysuria, no hematuria;   Gastrointestinal: No nausea, no vomiting. No diarrhea, No abdominal pain. No dark color stool, no melena; Neurological: No headache, no dizziness, no slurred speech;    Psychiatric: No agitation, + anxiety.  ALL OTHER REVIEW OF SYSTEMS ARE NEGATIVE.    ALLERGIES: Penicillin (Rash)    CURRENT MEDICATIONS:  diltiazem    Tablet 30 milliGRAM(s) Oral every 6 hours  diVALproex DR  sertraline  aspirin enteric coated  lactated ringers.  magnesium sulfate  IVPB    HOME MEDICATIONS:  diVALproex DR  sertraline    PAST MEDICAL HISTORY  Sleeping difficulty  Major depression  Anxiety and depression    PAST SURGICAL HISTORY  S/P hysterectomy    FAMILY HISTORY: FH: CAD (coronary artery disease) (Mother)    SOCIAL HISTORY:  + smoking 0.5 pck for 45years. no alcohol/drugs    Vital Signs Last 24 Hrs  T(C): 36.8 (28 Nov 2021 04:00), Max: 36.8 (28 Nov 2021 04:00)  T(F): 98.2 (28 Nov 2021 04:00), Max: 98.2 (28 Nov 2021 04:00)  HR: 120 (28 Nov 2021 04:00) (79 - 140)  BP: 112/74 (28 Nov 2021 04:00) (106/79 - 125/83)  BP(mean): 88 (28 Nov 2021 00:48) (88 - 88)  RR: 18 (28 Nov 2021 04:00) (18 - 23)  SpO2: 98% (28 Nov 2021 04:00) (95% - 98%)    PHYSICAL EXAM:  Constitutional: mild distress due to new medical condition   HEENT: Atraumatic, EIOMI, neck is supple, no JVD   CNS: A&Ox3. No focal motor deficits, sensory intact   Respiratory: CTA b/l  Cardiovascular: S1S2 RRR. No murmur/rubs  Gastrointestinal: Soft non-tender, non distended, +Bowel sounds  Extremities: No edema   Psychiatric: + anxious   Skin: + tattoos on LEs, no skin rash/ulcers visualized to face, hands or feet    Intake and output:     LABS:                        13.4   7.32  )-----------( 197      ( 27 Nov 2021 21:09 )             39.6     11-27    139  |  105  |  15.1  ----------------------------<  83  4.3   |  21.0<L>  |  0.83    Ca    9.0      27 Nov 2021 21:09  Mg     1.9     11-27    TPro  7.8  /  Alb  3.8  /  TBili  0.2<L>  /  DBili  x   /  AST  29  /  ALT  19  /  AlkPhos  75  11-27    CARDIAC MARKERS ( 28 Nov 2021 00:20 )  x     / <0.01 ng/mL / x     / x     / x      CARDIAC MARKERS ( 27 Nov 2021 21:09 )  x     / <0.01 ng/mL / x     / x     / x        ;p-BNP=Serum Pro-Brain Natriuretic Peptide: 232 pg/mL (11-27 @ 21:09)     PT: 11.7 sec;   INR: 1.01 ratio   PTT:30.6 sec    INTERPRETATION OF TELEMETRY: NSR w/ PVCs  ECG: A fib RVR    RADIOLOGY & ADDITIONAL STUDIES:   Preliminary X-ray: Clear    ECHO FINDINGS: Date:                                                                       [place] [facility]                                                        Office: [address] [address]                                                       Telephone: [phone]. Fax:[phone]                                                              CARDIOLOGY CONSULTATION NOTE                                                                                               History obtained by: Patient and medical record     obtained: No    Chief complaint: i have palpitations     HPI: Patient is a 61yo F c/o palpitations.  Patient reports worsening and more frequent episodes of rapid heart rate and last night she experienced palpitations with some chest tightness as well.  Also has some shortness of breath lately and admits to "smoking much heavier for the past couple of months."  Smokes about half a pack daily for past 45 years, denies ETOH or drug use.  States palpitations come on randomly, sometimes at rest other times while she moves around.  Events last a few seconds up to a minute, and associated with chest tightness.  Denies HA, N/V, diaphoresis, sick contacts, fever/chills. or cough.  Family history of arrhythmia.     REVIEW OF SYMPTOMS:   Cardiovascular: See HPI.  +chest tightness, no dyspnea, no syncope, +palpitations, no dizziness, no Orthopnea,      No Paroxsymal nocturnal dyspnea;    Respiratory: No Dyspnea, No cough,     Genitourinary: No dysuria, no hematuria;   Gastrointestinal: No nausea, no vomiting. No diarrhea, No abdominal pain. No dark color stool, no melena; Neurological: No headache, no dizziness, no slurred speech;    Psychiatric: No agitation, + anxiety.  ALL OTHER REVIEW OF SYSTEMS ARE NEGATIVE.    ALLERGIES: Penicillin (Rash)    CURRENT MEDICATIONS:  diltiazem    Tablet 30 milliGRAM(s) Oral every 6 hours  diVALproex DR  sertraline  aspirin enteric coated  lactated ringers.  magnesium sulfate  IVPB    HOME MEDICATIONS:  diVALproex DR  sertraline    PAST MEDICAL HISTORY  Sleeping difficulty  Major depression  Anxiety and depression    PAST SURGICAL HISTORY  S/P hysterectomy    FAMILY HISTORY: FH: CAD (coronary artery disease) (Mother)    SOCIAL HISTORY:  + smoking 0.5 pck for 45years. no alcohol/drugs    Vital Signs Last 24 Hrs  T(C): 36.8 (28 Nov 2021 04:00), Max: 36.8 (28 Nov 2021 04:00)  T(F): 98.2 (28 Nov 2021 04:00), Max: 98.2 (28 Nov 2021 04:00)  HR: 120 (28 Nov 2021 04:00) (79 - 140)  BP: 112/74 (28 Nov 2021 04:00) (106/79 - 125/83)  BP(mean): 88 (28 Nov 2021 00:48) (88 - 88)  RR: 18 (28 Nov 2021 04:00) (18 - 23)  SpO2: 98% (28 Nov 2021 04:00) (95% - 98%)    PHYSICAL EXAM:  Constitutional: mild distress due to new medical condition   HEENT: Atraumatic, EIOMI, neck is supple, no JVD   CNS: A&Ox3. No focal motor deficits, sensory intact   Respiratory: CTA b/l  Cardiovascular: S1S2 RRR. No murmur/rubs  Gastrointestinal: Soft non-tender, non distended, +Bowel sounds  Extremities: No edema   Psychiatric: + anxious   Skin: + tattoos on LEs, no skin rash/ulcers visualized to face, hands or feet    Intake and output:     LABS:                        13.4   7.32  )-----------( 197      ( 27 Nov 2021 21:09 )             39.6     11-27    139  |  105  |  15.1  ----------------------------<  83  4.3   |  21.0<L>  |  0.83    Ca    9.0      27 Nov 2021 21:09  Mg     1.9     11-27    TPro  7.8  /  Alb  3.8  /  TBili  0.2<L>  /  DBili  x   /  AST  29  /  ALT  19  /  AlkPhos  75  11-27    CARDIAC MARKERS ( 28 Nov 2021 00:20 )  x     / <0.01 ng/mL / x     / x     / x      CARDIAC MARKERS ( 27 Nov 2021 21:09 )  x     / <0.01 ng/mL / x     / x     / x      ;p-BNP=Serum Pro-Brain Natriuretic Peptide: 232 pg/mL (11-27 @ 21:09)     PT: 11.7 sec;   INR: 1.01 ratio   PTT:30.6 sec    INTERPRETATION OF TELEMETRY: NSR w/ PVCs  ECG: A fib RVR    RADIOLOGY & ADDITIONAL STUDIES:   Preliminary X-ray: Clear    ECHO FINDINGS: Date:

## 2021-11-28 NOTE — CONSULT NOTE ADULT - SUBJECTIVE AND OBJECTIVE BOX
Electrophysiology Attending Consult Note    HPI:  This is a 60 years old woman with h/o overweight (BMI 28), anxiety/depression on meds, hysterectomy and foot surgery. She has remote h/o alcohol abuse, stopped 5 years ago, and is an active smoker with > 40 ppd smoking history. She now presents with progressive PAPPAS, chest tightness and palpitation. She stated that for more than 5 months now she has been having PAPPAS and chest tightness with walking. Also has separate episodes of palpitation with rapid and irregular heart racing, that is a/w SOB. She has been having more frequent palpitation episodes, which sued to stop on their own in less than 1-2 hours. Yesterday she had a more severe and longer episode and so came to the ED. In the ED, found to have episodes of paroxysmal AFib with RVR. Started on Diltiazem 30 mg q 6 h, now up to 60 mg q 6hours.     ROS is also positive for wheeze with exertion. Denies any weight loss, loss of appetite, or night sweats.   CXR was abnormal and suspicious for right lung mass, CT done, see below     PAST MEDICAL & SURGICAL HISTORY:  Sleeping difficulty  Anxiety and depression  S/P hysterectomy      REVIEW OF SYSTEMS:    CONSTITUTIONAL: No fever, weight loss, or fatigue  EYES: No eye pain, visual disturbances, or discharge  ENMT:  No difficulty hearing, tinnitus, vertigo; No sinus or throat pain  NECK: No pain or stiffness  BREASTS: No pain, masses, or nipple discharge  RESPIRATORY: No cough, chills or hemoptysis. + Wheeze, and shortness of breath  CARDIOVASCULAR: + chest tightness and palpitations. Negative for dizziness, or leg swelling  GASTROINTESTINAL: No abdominal or epigastric pain. No nausea, vomiting, or hematemesis; No diarrhea or constipation. No melena or hematochezia.  GENITOURINARY: No dysuria, frequency, hematuria, or incontinence  NEUROLOGICAL: No headaches, memory loss, loss of strength, numbness, or tremors  SKIN: No itching, burning, rashes, or lesions   LYMPH NODES: No enlarged glands  ENDOCRINE: No heat or cold intolerance; No hair loss  MUSCULOSKELETAL: No joint pain or swelling; No muscle, back, or extremity pain  HEME/LYMPH: No easy bruising, or bleeding gums  ALLERY AND IMMUNOLOGIC: No hives or eczema      MEDICATIONS  (STANDING):  aspirin enteric coated 81 milliGRAM(s) Oral daily  diltiazem    Tablet 60 milliGRAM(s) Oral every 6 hours  diVALproex  milliGRAM(s) Oral every 12 hours  lactated ringers. 1000 milliLiter(s) (125 mL/Hr) IV Continuous <Continuous>  sertraline 25 milliGRAM(s) Oral daily    MEDICATIONS  (PRN):  QUEtiapine 200 milliGRAM(s) Oral at bedtime PRN sleep      Allergies    penicillin (Rash)    Intolerances        SOCIAL HISTORY:  - Lives alone, works for [employer]  - Active smoker, > 40-45 years  - H/o excessive alcohol abuse, sober for 5 years    FAMILY HISTORY:  - Negative for premature CAD, or SCD, PPM/ICD or transplant  FH: CAD (coronary artery disease) (Mother)        Vital Signs Last 24 Hrs  T(C): 36.9 (28 Nov 2021 12:52), Max: 36.9 (28 Nov 2021 12:52)  T(F): 98.4 (28 Nov 2021 12:52), Max: 98.4 (28 Nov 2021 12:52)  HR: 79 (28 Nov 2021 12:52) (67 - 140)  BP: 108/72 (28 Nov 2021 12:52) (106/79 - 125/83)  BP(mean): 88 (28 Nov 2021 00:48) (88 - 88)  RR: 18 (28 Nov 2021 12:52) (18 - 23)  SpO2: 94% (28 Nov 2021 12:52) (94% - 98%)    Physical Exam:  Constitutional: AAOx3, NAD  Neck: supple, No JVD  Cardiovascular: +S1S2 RRR, no murmurs, rubs, gallops   Pulmonary: CTA b/l, unlabored, no wheezes, rales. rhonci  Abdomen: soft NTND  Extremities: no edema b/l,   Neuro: non focal, speech clear, SOLIS x 4    LABS:                        13.4   7.32  )-----------( 197      ( 27 Nov 2021 21:09 )             39.6   11-27    139  |  105  |  15.1  ----------------------------<  83  4.3   |  21.0<L>  |  0.83    Ca    9.0      27 Nov 2021 21:09  Mg     1.9     11-27    TPro  7.8  /  Alb  3.8  /  TBili  0.2<L>  /  DBili  x   /  AST  29  /  ALT  19  /  AlkPhos  75  11-27  LIVER FUNCTIONS - ( 27 Nov 2021 21:09 )  Alb: 3.8 g/dL / Pro: 7.8 g/dL / ALK PHOS: 75 U/L / ALT: 19 U/L / AST: 29 U/L / GGT: x           PT/INR - ( 27 Nov 2021 21:09 )   PT: 11.7 sec;   INR: 1.01 ratio         PTT - ( 27 Nov 2021 21:09 )  PTT:30.6 secCARDIAC MARKERS ( 28 Nov 2021 04:20 )  x     / <0.01 ng/mL / x     / x     / x      CARDIAC MARKERS ( 28 Nov 2021 00:20 )  x     / <0.01 ng/mL / x     / x     / x      CARDIAC MARKERS ( 27 Nov 2021 21:09 )  x     / <0.01 ng/mL / x     / x     / x              RADIOLOGY & ADDITIONAL STUDIES:  EKG 11/28/21: AFib with RVR  EKG 11/27/21: NSR    Tele: PAF with RVR, now in sinus rhythm   TTE pending    < from: CT Angio Chest PE Protocol w/ IV Cont (11.28.21 @ 11:53) >  Pulmonary arteries:  Diagnostic image quality. No pulmonary embolism.  Lungs, airways and pleura: Patent airways to the segmental bronchi. Trace paraseptal emphysema in the apices. Two adjacent nodules in the left upper lobe, largest 1.2 cm (11-34). Few calcified granulomas. Unremarkable pleura.  Lymph nodes and mediastinum: No enlarged lymph nodes. Unremarkable thyroid.  Heart, pericardium, and vasculature: Normal heart size. Unremarkable pericardium. No aortic aneurysm. Mild aortic calcified and noncalcified plaque.  Bones and soft tissues: Degenerative changes of the spine.  Other: Large hiatal hernia. Small hepatic cyst.    IMPRESSION:  No pulmonary embolism.  Two adjacent left upper lobe nodules. Consider infection. 8 week follow-up is recommended to assess for improvement.    < end of copied text >      < from: Xray Chest 1 View- PORTABLE-Urgent (11.27.21 @ 22:02) >  COMPARISON: 1/25/2018  Patient is slightly rotated.  Large hiatal hernia. The cardiomediastinal silhouette and vern are not enlarged. The trachea is midline. Density right upper lung field overlying the first costochondral margin. Otherwise, no focal lung consolidation orsizable pleural effusion. No significant osseous abnormality.  IMPRESSION:  Density right upper lung field overlying the first costochondral margin. Recommend apical lordotic projection and or CT scan imaging to exclude lung lesion.  Large hiatal hernia.    < end of copied text >

## 2021-11-28 NOTE — H&P ADULT - HISTORY OF PRESENT ILLNESS
60 year old female with PMH Hypothyroidism, Depression/Anxiety presented with palpitations associated with exertional dyspnea which she's had for a few months, but recently got severe prompting her to come to the hospital.  Denies any chest pain, dizziness, coughing, fever, chills, nausea, vomiting, diaphoresis, dysuria, diarrhea or LOC. She though it was her anxiety acting up and relates lot of stress recently.  She was noted to new onset AF with RVR, and has been recommended admission by Cardiology for further evaluation.

## 2021-11-28 NOTE — ED ADULT NURSE REASSESSMENT NOTE - NS ED NURSE REASSESS COMMENT FT1
Care endorsed to GONZÁLEZ Garcia. Patient in understanding of plan of care of being admitted to the hospital. No further questions for the RN.
Notified by monitor tech patient in and out of Afib rhythm, HR . Patient walked to BR now c/o palpitations, patient walked back to room, pt on the stretcher, improvement. Oscar Ascencio at the bedside. Patient pending cardiology consult. No further orders received at this time.
SUSAN Flood from cardiology at bedside
pt remains AOX3, NSR on monitor with no complaints. reports she would like to go home. pt aware of need for repeat troponin at midnight prior to potential DC. pt with no chest pain no SOB, skin warm dry and intact. even and unlabored resps present. report given to night RN.
patient received AM meds as ordered. all questions answered. IVF running per order. in NAD. awaiting cardiology f/u and echo. tele in use. will ctm
care assumed from GONZÁLEZ Rosales. patient AAO x 4. c/o intermittent palpitations x days. states new onset afib. awaiting echo and cards in AM. tele in use. will ctm
patient with afib w/ RVR. trop drawn MD aware. PA to assess at bedside
Assumed care of the patient at 0730. Verbal report received from Sally CLAUDIO Obs. Patient A&Ox4. No s/s of acute distress or pain. NSR on CM. VSS. Denies any CP/SOB or palpitations. IVF infusing as per orders. Per USSAN Ascencio orders patient ok to eat, diet office notified. Patient pending TTE testing and cardiology consult. Patient in understanding of plan of care. Patient with no further questions for the RN. Resting in comfort. Call bell within reach and encouraged to use when assistance needed. Will continue to monitor.

## 2021-11-28 NOTE — ED CDU PROVIDER SUBSEQUENT DAY NOTE - PHYSICAL EXAMINATION
Const: AOX3 nontoxic appearing, no apparent respiratory or physical distress. on cardiac mon NSR   HEENT: NC/AT. Moist mucous membranes.  Eyes: ROXY. EOMI  Neck: Soft and supple. Full ROM without pain.  Cardiac: irregular rhythm  No murmurs. Peripheral pulses 2+ and symmetric. trace of none pitting edema b/l LE edema.  Resp: Speaking in full sentences. No evidence of respiratory distress. No wheezes, rales or rhonchi. No adventitious breath sounds   Abd: Soft, non-tender, non-distended. Normal bowel sounds in all 4 quadrants. No guarding or rebound.  Back: Spine midline and non-tender. No CVAT.  Skin: No rashes, abrasions or lacerations.  Neuro: Awake, alert & oriented x 3. Moves all extremities symmetrically.

## 2021-11-28 NOTE — ED CDU PROVIDER SUBSEQUENT DAY NOTE - HISTORY
pt new onset of Afib , start the pt on Cardizem initially . pt has proximal Aifib non sustained . that made cardiology aware. fluids and mag given pending and Echo and SSC re eval

## 2021-11-28 NOTE — ED CDU PROVIDER INITIAL DAY NOTE - OBJECTIVE STATEMENT
Pt is a 61 yo Female PMh of anxiety , depression co palpitations on and off x 1 month and she is feeling winded.  Pt states that last night she had onset of palpitations with some chest tightness. states she has some chest pain as well on mid chest none radiating x 1 month . Pt states that she has also had shortness of breath for the past month. Pt states that it has been constant but only occurs when she walks around but states that she has been smoking much heavier for the past couple of months. Pt states that the palpitations and tightness went away overnight but came back again this evening. no n/v. no fever/chills. no cough.

## 2021-11-28 NOTE — ED CDU PROVIDER DISPOSITION NOTE - CLINICAL COURSE
pt in observation, seen by cards and EP, with PAF. EP recommending admission for further ischemic w/u.

## 2021-11-28 NOTE — ED ADULT NURSE REASSESSMENT NOTE - SYMPTOMS
none
Reports feeling heart beating fast. Denies any discomfort or pain. Patient had another episode of PVC
none

## 2021-11-28 NOTE — CONSULT NOTE ADULT - ASSESSMENT
This is a 60 years old woman with h/o overweight (BMI 28), anxiety/depression on meds, hysterectomy and foot surgery. She has remote h/o alcohol abuse, stopped 5 years ago, and is an active smoker with > 40 ppd smoking history. She now presents with progressive PAPPAS, chest tightness and palpitation. She stated that for more than 5 months now she has been having PAPPAS and chest tightness with walking. Also has separate episodes of palpitation with rapid and irregular heart racing, that is a/w SOB. She has been having more frequent palpitation episodes, which sued to stop on their own in less than 1-2 hours. Yesterday she had a more severe and longer episode and so came to the ED. In the ED, found to have episodes of paroxysmal AFib with RVR. Started on Diltiazem 30 mg q 6 h, now up to 60 mg q 6hours.     TSH was high, thyroid dose adjusted/increased    ROS is also positive for wheeze with exertion. Denies any weight loss, loss of appetite, or night sweats.   CXR was abnormal and suspicious for right lung mass, CT done, and showed LEFT lung nodules, recommended follow up    1. New symptomatic paroxysmal AF with RVR, CHADS-VASc at least 1 for gender, awaiting TTE.   - Agree with Diltiazem for now as no clinical HF  - TTE  - Ischemia assessment. d/w Dr. Caro, will defer choice of testing to him. If no ischemia, will plan to start flecainide or propafenone after confirming no drug-drug interaction with her psych meds. If positive CAD, and/or SHD/HF will consider AC. Maybe a good candidate for ablation if lung nodules remain stable    2. Chest tightness, no signs of ACS, but has risk factors, ischemia evaluation is reasonable    3. Positive wheeze, suggest pulm consult for the wheeze, and lung nodules    NPO MN to allow for NST and/or LHC if needed tomorrow     will follow up    above d/w pt, and Dr. Caro 
61yo F with history of anxiety, depression, tobacco use complaining of intermittent palpitations, sensation of lightheadedness and malaise, found to have paroxysmal Atrial fibrillation on tele monitor

## 2021-11-28 NOTE — H&P ADULT - NSHPPHYSICALEXAM_GEN_ALL_CORE
OBJECTIVE:  Vital Signs Last 24 Hrs  T(C): 36.7 (28 Nov 2021 16:10), Max: 36.9 (28 Nov 2021 12:52)  T(F): 98 (28 Nov 2021 16:10), Max: 98.4 (28 Nov 2021 12:52)  HR: 63 (28 Nov 2021 16:10) (63 - 140)  BP: 104/67 (28 Nov 2021 16:10) (104/67 - 125/83)  BP(mean): 88 (28 Nov 2021 00:48) (88 - 88)  RR: 18 (28 Nov 2021 16:10) (18 - 23)  SpO2: 95% (28 Nov 2021 16:10) (94% - 98%)    PHYSICAL EXAMINATION  General: Overweight middle aged female lying in bed, NAD  HEENT:  extraocular movements intact, No facial asymmetry, moist oral mucosa  NECK:  Supple  CVS: regular rate and rhythm S1 S2  RESP:  CTAB  GI:  Soft nondistended nontender BS+  : No suprapubic tenderness  MSK:  FROM  CNS:  AAOx3, No gross focal or global deficit appreciated  INTEG:  warm dry skin  PSYCH:  Fair mood

## 2021-11-28 NOTE — CONSULT NOTE ADULT - PROBLEM SELECTOR RECOMMENDATION 9
Patient being monitored on Tele, check labs and ECG  - [id number]: 1 and placed on ASA by EDU  - keep K>4.0 and Mg>2.0  - risk of thromboembolic events in patients with pAF is lower than the risk in patients with persistent or permanent AF  - since patient w/ frequent or highly symptomatic pAF she'll require pharmacologic therapy (start IVF and Cardizem 30mg oral every 6hours) and increase dose as necessary for optimal rate/rhythm control  - schedule for TTE to assess LV function and structure  - increase Synthroid to 50mcg daily (TSH 5.53)

## 2021-11-28 NOTE — ED CDU PROVIDER INITIAL DAY NOTE - PHYSICAL EXAMINATION
Const: AOX3 nontoxic appearing, no apparent respiratory or physical distress. on cardiac mon NSR   HEENT: NC/AT. Moist mucous membranes.  Eyes: ROXY. EOMI  Neck: Soft and supple. Full ROM without pain.  Cardiac: Regular rate and regular rhythm. +S1/S2. No murmurs. Peripheral pulses 2+ and symmetric. trace of none pitting edema b/l LE edema.  Resp: Speaking in full sentences. No evidence of respiratory distress. No wheezes, rales or rhonchi. No adventitious breath sounds   Abd: Soft, non-tender, non-distended. Normal bowel sounds in all 4 quadrants. No guarding or rebound.  Back: Spine midline and non-tender. No CVAT.  Skin: No rashes, abrasions or lacerations.  Neuro: Awake, alert & oriented x 3. Moves all extremities symmetrically.

## 2021-11-29 ENCOUNTER — TRANSCRIPTION ENCOUNTER (OUTPATIENT)
Age: 61
End: 2021-11-29

## 2021-11-29 VITALS
HEART RATE: 69 BPM | RESPIRATION RATE: 18 BRPM | TEMPERATURE: 98 F | DIASTOLIC BLOOD PRESSURE: 66 MMHG | OXYGEN SATURATION: 92 % | SYSTOLIC BLOOD PRESSURE: 122 MMHG

## 2021-11-29 LAB
COVID-19 NUCLEOCAPSID GAM AB INTERP: POSITIVE
COVID-19 NUCLEOCAPSID TOTAL GAM ANTIBODY RESULT: 143 INDEX — HIGH
COVID-19 SPIKE DOMAIN AB INTERP: POSITIVE
COVID-19 SPIKE DOMAIN ANTIBODY RESULT: >250 U/ML — HIGH
HCV AB S/CO SERPL IA: 0.19 S/CO — SIGNIFICANT CHANGE UP (ref 0–0.99)
HCV AB SERPL-IMP: SIGNIFICANT CHANGE UP
SARS-COV-2 IGG+IGM SERPL QL IA: 143 INDEX — HIGH
SARS-COV-2 IGG+IGM SERPL QL IA: >250 U/ML — HIGH
SARS-COV-2 IGG+IGM SERPL QL IA: POSITIVE
SARS-COV-2 IGG+IGM SERPL QL IA: POSITIVE

## 2021-11-29 PROCEDURE — 84443 ASSAY THYROID STIM HORMONE: CPT

## 2021-11-29 PROCEDURE — 71275 CT ANGIOGRAPHY CHEST: CPT | Mod: MA

## 2021-11-29 PROCEDURE — ZZZZZ: CPT

## 2021-11-29 PROCEDURE — 80053 COMPREHEN METABOLIC PANEL: CPT

## 2021-11-29 PROCEDURE — 83880 ASSAY OF NATRIURETIC PEPTIDE: CPT

## 2021-11-29 PROCEDURE — 99239 HOSP IP/OBS DSCHRG MGMT >30: CPT

## 2021-11-29 PROCEDURE — 93018 CV STRESS TEST I&R ONLY: CPT

## 2021-11-29 PROCEDURE — 93005 ELECTROCARDIOGRAM TRACING: CPT

## 2021-11-29 PROCEDURE — 94640 AIRWAY INHALATION TREATMENT: CPT

## 2021-11-29 PROCEDURE — 86803 HEPATITIS C AB TEST: CPT

## 2021-11-29 PROCEDURE — U0003: CPT

## 2021-11-29 PROCEDURE — 86769 SARS-COV-2 COVID-19 ANTIBODY: CPT

## 2021-11-29 PROCEDURE — 78452 HT MUSCLE IMAGE SPECT MULT: CPT | Mod: 26

## 2021-11-29 PROCEDURE — 99232 SBSQ HOSP IP/OBS MODERATE 35: CPT

## 2021-11-29 PROCEDURE — 85379 FIBRIN DEGRADATION QUANT: CPT

## 2021-11-29 PROCEDURE — 36415 COLL VENOUS BLD VENIPUNCTURE: CPT

## 2021-11-29 PROCEDURE — 96375 TX/PRO/DX INJ NEW DRUG ADDON: CPT

## 2021-11-29 PROCEDURE — 71045 X-RAY EXAM CHEST 1 VIEW: CPT

## 2021-11-29 PROCEDURE — U0005: CPT

## 2021-11-29 PROCEDURE — 86703 HIV-1/HIV-2 1 RESULT ANTBDY: CPT

## 2021-11-29 PROCEDURE — 85610 PROTHROMBIN TIME: CPT

## 2021-11-29 PROCEDURE — 93017 CV STRESS TEST TRACING ONLY: CPT

## 2021-11-29 PROCEDURE — 85025 COMPLETE CBC W/AUTO DIFF WBC: CPT

## 2021-11-29 PROCEDURE — 96374 THER/PROPH/DIAG INJ IV PUSH: CPT

## 2021-11-29 PROCEDURE — 83735 ASSAY OF MAGNESIUM: CPT

## 2021-11-29 PROCEDURE — 84436 ASSAY OF TOTAL THYROXINE: CPT

## 2021-11-29 PROCEDURE — 93016 CV STRESS TEST SUPVJ ONLY: CPT

## 2021-11-29 PROCEDURE — 93306 TTE W/DOPPLER COMPLETE: CPT

## 2021-11-29 PROCEDURE — 93306 TTE W/DOPPLER COMPLETE: CPT | Mod: 26

## 2021-11-29 PROCEDURE — 78452 HT MUSCLE IMAGE SPECT MULT: CPT

## 2021-11-29 PROCEDURE — 84484 ASSAY OF TROPONIN QUANT: CPT

## 2021-11-29 PROCEDURE — A9500: CPT

## 2021-11-29 PROCEDURE — 99285 EMERGENCY DEPT VISIT HI MDM: CPT

## 2021-11-29 PROCEDURE — 85730 THROMBOPLASTIN TIME PARTIAL: CPT

## 2021-11-29 RX ORDER — BUDESONIDE AND FORMOTEROL FUMARATE DIHYDRATE 160; 4.5 UG/1; UG/1
2 AEROSOL RESPIRATORY (INHALATION)
Qty: 1 | Refills: 0
Start: 2021-11-29 | End: 2021-12-28

## 2021-11-29 RX ORDER — HYDROXYZINE HCL 10 MG
1 TABLET ORAL
Qty: 0 | Refills: 0 | DISCHARGE

## 2021-11-29 RX ORDER — ALBUTEROL 90 UG/1
2 AEROSOL, METERED ORAL
Qty: 1 | Refills: 0
Start: 2021-11-29

## 2021-11-29 RX ORDER — DILTIAZEM HCL 120 MG
1 CAPSULE, EXT RELEASE 24 HR ORAL
Qty: 30 | Refills: 0
Start: 2021-11-29 | End: 2021-12-28

## 2021-11-29 RX ORDER — FLECAINIDE ACETATE 50 MG
1 TABLET ORAL
Qty: 60 | Refills: 0
Start: 2021-11-29 | End: 2021-12-28

## 2021-11-29 RX ORDER — FLECAINIDE ACETATE 50 MG
50 TABLET ORAL
Refills: 0 | Status: DISCONTINUED | OUTPATIENT
Start: 2021-11-29 | End: 2021-11-29

## 2021-11-29 RX ORDER — ASPIRIN/CALCIUM CARB/MAGNESIUM 324 MG
1 TABLET ORAL
Qty: 0 | Refills: 0 | DISCHARGE
Start: 2021-11-29

## 2021-11-29 RX ORDER — DILTIAZEM HCL 120 MG
120 CAPSULE, EXT RELEASE 24 HR ORAL DAILY
Refills: 0 | Status: DISCONTINUED | OUTPATIENT
Start: 2021-11-29 | End: 2021-11-29

## 2021-11-29 RX ADMIN — Medication 81 MILLIGRAM(S): at 15:56

## 2021-11-29 RX ADMIN — ALBUTEROL 2.5 MILLIGRAM(S): 90 AEROSOL, METERED ORAL at 08:28

## 2021-11-29 RX ADMIN — SERTRALINE 25 MILLIGRAM(S): 25 TABLET, FILM COATED ORAL at 15:56

## 2021-11-29 RX ADMIN — Medication 25 MICROGRAM(S): at 05:45

## 2021-11-29 RX ADMIN — Medication 60 MILLIGRAM(S): at 00:08

## 2021-11-29 RX ADMIN — DIVALPROEX SODIUM 250 MILLIGRAM(S): 500 TABLET, DELAYED RELEASE ORAL at 18:26

## 2021-11-29 RX ADMIN — DIVALPROEX SODIUM 250 MILLIGRAM(S): 500 TABLET, DELAYED RELEASE ORAL at 05:45

## 2021-11-29 RX ADMIN — Medication 50 MILLIGRAM(S): at 18:26

## 2021-11-29 NOTE — CHART NOTE - NSCHARTNOTEFT_GEN_A_CORE
To whom it may concern    Erica Galloway was admitted to [facility] 11/28-11/29/21.   She may return to work on 12/6/21.

## 2021-11-29 NOTE — DISCHARGE NOTE PROVIDER - NSDCCPCAREPLAN_GEN_ALL_CORE_FT
PRINCIPAL DISCHARGE DIAGNOSIS  Diagnosis: Atrial fibrillation with RVR  Assessment and Plan of Treatment: Continue medications as prescribed  Follow up with Cardiology      SECONDARY DISCHARGE DIAGNOSES  Diagnosis: Emphysema lung  Assessment and Plan of Treatment: Continue medications    Diagnosis: Lung nodule  Assessment and Plan of Treatment: Repeat CT scan in 8 weeks    Diagnosis: Hypothyroidism  Assessment and Plan of Treatment: Continue home medications  Follow up with PMD    Diagnosis: Anxiety with depression  Assessment and Plan of Treatment: Continue home medications

## 2021-11-29 NOTE — PROGRESS NOTE ADULT - ASSESSMENT
10 year old female smoker with PMH Hypothyroidism, Anxiety with depression presents with palpitations and exertional dyspnea. Noted to have new onset AF with RVR. TSH slightly elevated. CT chest with SUSANNAH nodules.    AF with RVR, CHADSVASC 1. Now in NSR, SB. TTE with intact function  - monitored bed  - Cardizem 60 q6 - reduce given bradycardia  - Cardiology/EP follow up    Smoking with emphysema on CT scan  - Albuterol, Symbicort  - Repeat CT scan in 8 weeks  - Smoking cessation; refused NRT    Depression/Anxety  - Continue home medications; Depakote, Sertraline and Seroquel    Hypothyroidism  - Continue Synthroid 25 for now  - Repeat TSH pending    VTEp - VCD  Incentive Spirometry    Work up in progress

## 2021-11-29 NOTE — PROGRESS NOTE ADULT - SUBJECTIVE AND OBJECTIVE BOX
HOSPITALIST PROGRESS NOTE    JUAN HOANG  [id number]  60yFemale    Patient is a 60y old  Female who presents with a chief complaint of palpitations (29 Nov 2021 14:51)      SUBJECTIVE:   Chart reviewed since last visit.  Patient seen and examined at bedside for new onset AF  Occasional palpitations  Denies any further chest tightness.  Denies any chest pain, dyspnea, cough or dizziness      OBJECTIVE:  Vital Signs Last 24 Hrs  T(C): 36.7 (29 Nov 2021 08:30), Max: 36.9 (29 Nov 2021 04:17)  T(F): 98 (29 Nov 2021 08:30), Max: 98.5 (29 Nov 2021 04:17)  HR: 50 (29 Nov 2021 08:30) (50 - 69)  BP: 108/66 (29 Nov 2021 08:30) (96/59 - 148/84)   RR: 18 (29 Nov 2021 08:30) (18 - 19)  SpO2: 96% (29 Nov 2021 08:30) (94% - 96%)    PHYSICAL EXAMINATION  General: Overweight middle aged female lying in bed, NAD  HEENT:  extraocular movements intact, No facial asymmetry, moist oral mucosa  NECK:  Supple  CVS: regular rate and rhythm S1 S2  RESP:  CTAB  GI:  Soft nondistended nontender BS+  : No suprapubic tenderness  MSK:  FROM  CNS:  AAOx3, No gross focal or global deficit appreciated  INTEG:  warm dry skin  PSYCH:  Fair mood    MONITOR: Sinus bradycardia    CAPILLARY BLOOD GLUCOSE            I&O's Summary                          13.4   7.32  )-----------( 197      ( 27 Nov 2021 21:09 )             39.6     PT/INR - ( 27 Nov 2021 21:09 )   PT: 11.7 sec;   INR: 1.01 ratio         PTT - ( 27 Nov 2021 21:09 )  PTT:30.6 sec  11-27    139  |  105  |  15.1  ----------------------------<  83  4.3   |  21.0<L>  |  0.83    Ca    9.0      27 Nov 2021 21:09  Mg     1.9     11-27    TPro  7.8  /  Alb  3.8  /  TBili  0.2<L>  /  DBili  x   /  AST  29  /  ALT  19  /  AlkPhos  75  11-27    CARDIAC MARKERS ( 28 Nov 2021 04:20 )  x     / <0.01 ng/mL / x     / x     / x      CARDIAC MARKERS ( 28 Nov 2021 00:20 )  x     / <0.01 ng/mL / x     / x     / x      CARDIAC MARKERS ( 27 Nov 2021 21:09 )  x     / <0.01 ng/mL / x     / x     / x              Culture:    TTE:  < from: TTE Echo Complete w/o Contrast w/ Doppler (11.29.21 @ 08:39) >  Summary:   1. Technically difficult study.   2. Normal left ventricular internal cavity size.   3. Normal global left ventricular systolic function.   4. Left ventricular ejection fraction, by visualestimation, is 65 to 70%.   5. Sclerotic aortic valve with normal opening.   6. Moderate aortic regurgitation.   7. Mild thickening of the anterior and posterior mitral valve leaflets.   8. Mild mitral valve regurgitation.   9. Mild tricuspid regurgitation.  10. There is no evidence of pericardial effusion.    MD Maryanne Electronically signed on 11/29/2021 at 11:22:11 AM            *** Final ***              KAYCEE MCARTHUR   This document has been electronically signed. Nov 29 2021  8:39AM    < end of copied text >    RADIOLOGY        MEDICATIONS  (STANDING):  ALBUTerol    0.083% 2.5 milliGRAM(s) Nebulizer every 6 hours  aspirin enteric coated 81 milliGRAM(s) Oral daily  budesonide  80 MICROgram(s)/formoterol 4.5 MICROgram(s) Inhaler 2 Puff(s) Inhalation two times a day  diltiazem    Tablet 60 milliGRAM(s) Oral every 6 hours  diVALproex  milliGRAM(s) Oral every 12 hours  influenza   Vaccine 0.5 milliLiter(s) IntraMuscular once  lactated ringers. 1000 milliLiter(s) (125 mL/Hr) IV Continuous <Continuous>  levothyroxine 25 MICROGram(s) Oral daily  sertraline 25 milliGRAM(s) Oral daily      MEDICATIONS  (PRN):  QUEtiapine 200 milliGRAM(s) Oral at bedtime PRN sleep

## 2021-11-29 NOTE — DISCHARGE NOTE PROVIDER - CARE PROVIDER_API CALL
Narendra Cates)  Cardiac Electrophysiology; Cardiovascular Disease; Internal Medicine  [address] [place] [id number]  Phone: [phone]  Fax: [phone]  Follow Up Time:

## 2021-11-29 NOTE — DISCHARGE NOTE PROVIDER - HOSPITAL COURSE
60 year old female smoker with PMH Hypothyroidism, Anxiety with depression presents with palpitations and exertional dyspnea. Noted to have new onset AF with RVR. TSH slightly elevated. CT chest with SUSANNAH nodules.    AF with RVR, CHADSVASC 1. Returned to SR. TTE with intact function. NST negative for ischemia. Evaluated by EP with recommendations for Diltiazem and Flecainide. Cleared by Cardiology for discharge.  She was continued on her home medications for depression and anxiety as well as Synthroid for hypothyroidism.  Noted to have emphysema on CT scan and Symbicort and Albuterol were added. 2 SUSANNAH pulmonary nodules were also noted; patient advised to have follow up CT scan in 8 weeks.  Medically stable for discharge home.  Discharge time 35 minutes

## 2021-11-29 NOTE — PROGRESS NOTE ADULT - SUBJECTIVE AND OBJECTIVE BOX
[place] [facility]                                                               Office: [address] [address]                                                              Telephone: [phone]. Fax:[phone]                                                                             PROGRESS NOTE  Reason for follow up: Palpitations  Overnight: No new events.   Update:     Subjective: No new events     	  Vitals:  T(C): 36.7 (11-29-21 @ 08:30), Max: 36.9 (11-29-21 @ 04:17)  HR: 50 (11-29-21 @ 08:30) (50 - 69)  BP: 108/66 (11-29-21 @ 08:30) (96/59 - 148/84)  RR: 18 (11-29-21 @ 08:30) (18 - 19)  SpO2: 96% (11-29-21 @ 08:30) (94% - 96%)    I&O's Summary    Weight (kg): 63.5 (11-27 @ 20:21)      PHYSICAL EXAM:  Appearance: Comfortable. No acute distress  HEENT:  Head and neck: Atraumatic. Normocephalic.  Normal oral mucosa, PERRL, Neck is supple. No JVD, No carotid bruit.   Neurologic: A & O x 3, no focal deficits. EOMI.  Lymphatic: No cervical lymphadenopathy  Cardiovascular: Normal S1 S2, No murmur, rubs/gallops. No JVD, No edema  Respiratory: Lungs clear to auscultation  Gastrointestinal:  Soft, Non-tender, + BS  Lower Extremities: No edema  Psychiatry: Patient is calm. No agitation. Mood & affect appropriate  Skin: No rashes/ ecchymoses/cyanosis/ulcers visualized on the face, hands or feet.      CURRENT MEDICATIONS:  diltiazem    Tablet 60 milliGRAM(s) Oral every 6 hours  ALBUTerol    0.083%  budesonide  80 MICROgram(s)/formoterol 4.5 MICROgram(s) Inhaler  diVALproex DR  sertraline  levothyroxine  aspirin enteric coated  influenza   Vaccine  lactated ringers.      DIAGNOSTIC TESTING:    [ ] Echocardiogram: < from: TTE Echo Complete w/o Contrast w/ Doppler (11.29.21 @ 08:39) >  Summary:   1. Technically difficult study.   2. Normal left ventricular internal cavity size.   3. Normal global left ventricular systolic function.   4. Left ventricular ejection fraction, by visualestimation, is 65 to 70%.   5. Sclerotic aortic valve with normal opening.   6. Moderate aortic regurgitation.   7. Mild thickening of the anterior and posterior mitral valve leaflets.   8. Mild mitral valve regurgitation.   9. Mild tricuspid regurgitation.  10. There is no evidence of pericardial effusion.      OTHER: 	    CT:< from: CT Angio Chest PE Protocol w/ IV Cont (11.28.21 @ 11:53) >  IMPRESSION:    No pulmonary embolism.    Two adjacent left upper lobe nodules. Consider infection. 8 week follow-up is recommended to assess for improvement.      Xray:< from: Xray Chest 1 View- PORTABLE-Urgent (11.27.21 @ 22:02) >    IMPRESSION:    Density right upper lung field overlying the first costochondral margin. Recommend apical lordotic projection and or CT scan imaging to exclude lung lesion. Discussed with SUSAN Baxter on 11/28/2021 at 8:35 AM.    Large hiatal hernia.      LABS:	 	  CARDIAC MARKERS ( 28 Nov 2021 04:20 )  x     / <0.01 ng/mL / x     / x     / x      p-BNP 28 Nov 2021 04:20: x    , CARDIAC MARKERS ( 28 Nov 2021 00:20 )  x     / <0.01 ng/mL / x     / x     / x      p-BNP 28 Nov 2021 00:20: x    , CARDIAC MARKERS ( 27 Nov 2021 21:09 )  x     / <0.01 ng/mL / x     / x     / x      p-BNP 27 Nov 2021 21:09: 232 pg/mL                          13.4   7.32  )-----------( 197      ( 27 Nov 2021 21:09 )             39.6     11-27    139  |  105  |  15.1  ----------------------------<  83  4.3   |  21.0<L>  |  0.83    Ca    9.0      27 Nov 2021 21:09  Mg     1.9     11-27    TPro  7.8  /  Alb  3.8  /  TBili  0.2<L>  /  DBili  x   /  AST  29  /  ALT  19  /  AlkPhos  75  11-27    proBNP: Serum Pro-Brain Natriuretic Peptide: 232 pg/mL (11-27 @ 21:09)      TSH: Thyroid Stimulating Hormone, Serum: 5.53 uIU/mL        TELEMETRY:    	                                                                      [place] [facility]                                                       [facility]                                                               Office: [address] [address]                                                              Telephone: [phone]. Fax:[phone]                                                                             PROGRESS NOTE  Reason for follow up: Palpitations  Overnight: No new events.   Update: 11/29: Pt denies chest pain, palpitations, SOB. Tele-NSR HR @ 60s, converted at 0830 from Afib with RVR . Echo-EF 65-70%, sclerotic AV with normal opening, moderate AR, mild MVR, mild TR, no evidence of pericardial effusion. NST-completed, awaiting results. As per EP consult "If no ischemia, will plan to start flecainide or propafenone...If positive CAD, and/or SHD/HF will consider AC. Maybe a good candidate for ablation if lung nodules remain stable".     Subjective: No new events     	  Vitals:  T(C): 36.7 (11-29-21 @ 08:30), Max: 36.9 (11-29-21 @ 04:17)  HR: 50 (11-29-21 @ 08:30) (50 - 69)  BP: 108/66 (11-29-21 @ 08:30) (96/59 - 148/84)  RR: 18 (11-29-21 @ 08:30) (18 - 19)  SpO2: 96% (11-29-21 @ 08:30) (94% - 96%)    I&O's Summary    Weight (kg): 63.5 (11-27 @ 20:21)      PHYSICAL EXAM:  Appearance: Comfortable. No acute distress  HEENT:  Head and neck: Atraumatic. Normocephalic.  Normal oral mucosa, PERRL, Neck is supple. No JVD, No carotid bruit.   Neurologic: A & O x 3, no focal deficits. EOMI.  Lymphatic: No cervical lymphadenopathy  Cardiovascular: Normal S1 S2, No murmur, rubs/gallops. No JVD, No edema  Respiratory: Lungs clear to auscultation  Gastrointestinal:  Soft, Non-tender, + BS  Lower Extremities: No edema  Psychiatry: Patient is calm. No agitation. Mood & affect appropriate  Skin: No rashes/ ecchymoses/cyanosis/ulcers visualized on the face, hands or feet.      CURRENT MEDICATIONS:  diltiazem    Tablet 60 milliGRAM(s) Oral every 6 hours  ALBUTerol    0.083%  budesonide  80 MICROgram(s)/formoterol 4.5 MICROgram(s) Inhaler  diVALproex DR  sertraline  levothyroxine  aspirin enteric coated  influenza   Vaccine  lactated ringers.      DIAGNOSTIC TESTING:    [ ] Echocardiogram: < from: TTE Echo Complete w/o Contrast w/ Doppler (11.29.21 @ 08:39) >  Summary:   1. Technically difficult study.   2. Normal left ventricular internal cavity size.   3. Normal global left ventricular systolic function.   4. Left ventricular ejection fraction, by visualestimation, is 65 to 70%.   5. Sclerotic aortic valve with normal opening.   6. Moderate aortic regurgitation.   7. Mild thickening of the anterior and posterior mitral valve leaflets.   8. Mild mitral valve regurgitation.   9. Mild tricuspid regurgitation.  10. There is no evidence of pericardial effusion.      OTHER: 	    CT:< from: CT Angio Chest PE Protocol w/ IV Cont (11.28.21 @ 11:53) >  IMPRESSION:    No pulmonary embolism.    Two adjacent left upper lobe nodules. Consider infection. 8 week follow-up is recommended to assess for improvement.      Xray:< from: Xray Chest 1 View- PORTABLE-Urgent (11.27.21 @ 22:02) >    IMPRESSION:    Density right upper lung field overlying the first costochondral margin. Recommend apical lordotic projection and or CT scan imaging to exclude lung lesion. Discussed with SUSAN Baxter on 11/28/2021 at 8:35 AM.    Large hiatal hernia.      LABS:	 	  CARDIAC MARKERS ( 28 Nov 2021 04:20 )  x     / <0.01 ng/mL / x     / x     / x      p-BNP 28 Nov 2021 04:20: x    , CARDIAC MARKERS ( 28 Nov 2021 00:20 )  x     / <0.01 ng/mL / x     / x     / x      p-BNP 28 Nov 2021 00:20: x    , CARDIAC MARKERS ( 27 Nov 2021 21:09 )  x     / <0.01 ng/mL / x     / x     / x      p-BNP 27 Nov 2021 21:09: 232 pg/mL                          13.4   7.32  )-----------( 197      ( 27 Nov 2021 21:09 )             39.6     11-27    139  |  105  |  15.1  ----------------------------<  83  4.3   |  21.0<L>  |  0.83    Ca    9.0      27 Nov 2021 21:09  Mg     1.9     11-27    TPro  7.8  /  Alb  3.8  /  TBili  0.2<L>  /  DBili  x   /  AST  29  /  ALT  19  /  AlkPhos  75  11-27    proBNP: Serum Pro-Brain Natriuretic Peptide: 232 pg/mL (11-27 @ 21:09)      TSH: Thyroid Stimulating Hormone, Serum: 5.53 uIU/mL        TELEMETRY: NSR HR @ 60s, converted at 0830 from Afib with RVR   	                                                                      [place] [facility]                                                       [facility]                                                               Office: [address] [address]                                                              Telephone: [phone]. Fax:[phone]                                                                             PROGRESS NOTE  Reason for follow up: Palpitations  Overnight: No new events.   Update: 11/29: Pt denies chest pain, palpitations, SOB. Tele-NSR HR @ 60s, converted at 0830 from Afib with RVR . Echo-EF 65-70%, sclerotic AV with normal opening, moderate AR, mild MVR, mild TR, no evidence of pericardial effusion. NST-completed, awaiting results. As per EP consult "If no ischemia, will plan to start flecainide or propafenone...If positive CAD, and/or SHD/HF will consider AC. Maybe a good candidate for ablation if lung nodules remain stable".     Subjective: No new events     	  Vitals:  T(C): 36.7 (11-29-21 @ 08:30), Max: 36.9 (11-29-21 @ 04:17)  HR: 50 (11-29-21 @ 08:30) (50 - 69)  BP: 108/66 (11-29-21 @ 08:30) (96/59 - 148/84)  RR: 18 (11-29-21 @ 08:30) (18 - 19)  SpO2: 96% (11-29-21 @ 08:30) (94% - 96%)    I&O's Summary    Weight (kg): 63.5 (11-27 @ 20:21)      PHYSICAL EXAM:  Appearance: Comfortable. No acute distress  HEENT:  Head and neck: Atraumatic. Normocephalic.  Normal oral mucosa, PERRL, Neck is supple. No JVD, No carotid bruit.   Neurologic: A & O x 3, no focal deficits. EOMI.  Lymphatic: No cervical lymphadenopathy  Cardiovascular: Normal S1 S2, No murmur, rubs/gallops. No JVD, No edema  Respiratory: Lungs clear to auscultation  Gastrointestinal:  Soft, Non-tender, + BS  Lower Extremities: No edema  Psychiatry: Patient is calm. No agitation. Mood & affect appropriate  Skin: No rashes/ ecchymoses/cyanosis/ulcers visualized on the face, hands or feet.      CURRENT MEDICATIONS:  diltiazem    Tablet 60 milliGRAM(s) Oral every 6 hours  ALBUTerol    0.083%  budesonide  80 MICROgram(s)/formoterol 4.5 MICROgram(s) Inhaler  diVALproex DR  sertraline  levothyroxine  aspirin enteric coated  influenza   Vaccine  lactated ringers.      DIAGNOSTIC TESTING:    [ ] Echocardiogram: < from: TTE Echo Complete w/o Contrast w/ Doppler (11.29.21 @ 08:39) >  Summary:   1. Technically difficult study.   2. Normal left ventricular internal cavity size.   3. Normal global left ventricular systolic function.   4. Left ventricular ejection fraction, by visualestimation, is 65 to 70%.   5. Sclerotic aortic valve with normal opening.   6. Moderate aortic regurgitation.   7. Mild thickening of the anterior and posterior mitral valve leaflets.   8. Mild mitral valve regurgitation.   9. Mild tricuspid regurgitation.  10. There is no evidence of pericardial effusion.      OTHER: 	    CT:< from: CT Angio Chest PE Protocol w/ IV Cont (11.28.21 @ 11:53) >  IMPRESSION:    No pulmonary embolism.    Two adjacent left upper lobe nodules. Consider infection. 8 week follow-up is recommended to assess for improvement.      Xray:< from: Xray Chest 1 View- PORTABLE-Urgent (11.27.21 @ 22:02) >    IMPRESSION:    Density right upper lung field overlying the first costochondral margin. Recommend apical lordotic projection and or CT scan imaging to exclude lung lesion. Discussed with SUSAN Baxter on 11/28/2021 at 8:35 AM.    Large hiatal hernia.      LABS:	 	  CARDIAC MARKERS ( 28 Nov 2021 04:20 )  x     / <0.01 ng/mL / x     / x     / x      p-BNP 28 Nov 2021 04:20: x    , CARDIAC MARKERS ( 28 Nov 2021 00:20 )  x     / <0.01 ng/mL / x     / x     / x      p-BNP 28 Nov 2021 00:20: x    , CARDIAC MARKERS ( 27 Nov 2021 21:09 )  x     / <0.01 ng/mL / x     / x     / x      p-BNP 27 Nov 2021 21:09: 232 pg/mL                          13.4   7.32  )-----------( 197      ( 27 Nov 2021 21:09 )             39.6     11-27    139  |  105  |  15.1  ----------------------------<  83  4.3   |  21.0<L>  |  0.83    Ca    9.0      27 Nov 2021 21:09  Mg     1.9     11-27    TPro  7.8  /  Alb  3.8  /  TBili  0.2<L>  /  DBili  x   /  AST  29  /  ALT  19  /  AlkPhos  75  11-27    proBNP: Serum Pro-Brain Natriuretic Peptide: 232 pg/mL (11-27 @ 21:09)  TSH: Thyroid Stimulating Hormone, Serum: 5.53 uIU/mL  TELEMETRY: NSR HR @ 60s, converted at 0830 from Afib with RVR

## 2021-11-29 NOTE — PROGRESS NOTE ADULT - ATTENDING COMMENTS
seen and examined, agree with above. d/w pt, and primary team. To see pulm regarding the lung nodules and ? asthma  will see me in 2-3 weeks as o/p   counseled about flecainide, aspirin, and diltiazem use
Above noted, pt was seen and examined  doing well, Stress test negative for ischemia  on cardizem and tombacor as per EP.  I agree with a/p.  Pt is advised to fu with ep

## 2021-11-29 NOTE — DISCHARGE NOTE PROVIDER - NSDCMRMEDTOKEN_GEN_ALL_CORE_FT
Albuterol (Eqv-ProAir HFA) 90 mcg/inh inhalation aerosol: 2 puff(s) inhaled 4 times a day, As Needed   aspirin 81 mg oral delayed release tablet: 1 tab(s) orally once a day  budesonide-formoterol 80 mcg-4.5 mcg/inh inhalation aerosol: 2 puff(s) inhaled 3 times a day   Depakote 250 mg oral delayed release tablet: 1 tab(s) orally 2 times a day  dilTIAZem 120 mg/24 hours oral capsule, extended release: 1 cap(s) orally once a day  flecainide 50 mg oral tablet: 1 tab(s) orally 2 times a day  SEROquel 200 mg oral tablet: 1 tab(s) orally once a day (at bedtime)  sertraline 25 mg oral tablet: 1 tab(s) orally once a day  Synthroid 25 mcg (0.025 mg) oral tablet: 1 tab(s) orally once a day

## 2021-11-29 NOTE — PROGRESS NOTE ADULT - SUBJECTIVE AND OBJECTIVE BOX
Pt doing well, denies complaint. Remains in sinus rhythm w/ intact conduction. Sinus rates to 50s bpm, asymptomatic, no significant bradycardia or high risk pauses.     MEDICATIONS  (STANDING):  ALBUTerol    0.083% 2.5 milliGRAM(s) Nebulizer every 6 hours  aspirin enteric coated 81 milliGRAM(s) Oral daily  budesonide  80 MICROgram(s)/formoterol 4.5 MICROgram(s) Inhaler 2 Puff(s) Inhalation two times a day  diltiazem    Tablet 60 milliGRAM(s) Oral every 6 hours  diVALproex  milliGRAM(s) Oral every 12 hours  influenza   Vaccine 0.5 milliLiter(s) IntraMuscular once  lactated ringers. 1000 milliLiter(s) (125 mL/Hr) IV Continuous <Continuous>  levothyroxine 25 MICROGram(s) Oral daily  sertraline 25 milliGRAM(s) Oral daily    MEDICATIONS  (PRN):  QUEtiapine 200 milliGRAM(s) Oral at bedtime PRN sleep      Allergies  penicillin (Rash)      Vital Signs Last 24 Hrs  T(C): 36.8 (29 Nov 2021 15:47), Max: 36.9 (29 Nov 2021 04:17)  T(F): 98.2 (29 Nov 2021 15:47), Max: 98.5 (29 Nov 2021 04:17)  HR: 69 (29 Nov 2021 15:47) (50 - 69)  BP: 122/66 (29 Nov 2021 15:47) (96/59 - 148/84)  RR: 18 (29 Nov 2021 15:47) (18 - 19)  SpO2: 92% (29 Nov 2021 15:47) (92% - 96%)    Physical Exam:  Constitutional: NAD, AAOx3  Cardiovascular: +S1S2 RRR  Pulmonary: CTA b/l, unlabored  GI: soft NTND +BS  Extremities: no pedal edema, +distal pulses b/l  Neuro: non focal, SOLIS x4    LABS:                      13.4   7.32  )-----------( 197      ( 27 Nov 2021 21:09 )             39.6     139  |  105  |  15.1  ----------------------------<  83  4.3   |  21.0<L>  |  0.83    Ca    9.0      27 Nov 2021 21:09  Mg     1.9     11-27    TPro  7.8  /  Alb  3.8  /  TBili  0.2<L>  /  DBili  x   /  AST  29  /  ALT  19  /  AlkPhos  75  11-27    PT/INR - ( 27 Nov 2021 21:09 )   PT: 11.7 sec;   INR: 1.01 ratio         PTT - ( 27 Nov 2021 21:09 )  PTT:30.6 sec      RADIOLOGY & ADDITIONAL TESTS:  < from: Nuclear Stress Test-Pharmacologic (11.29.21 @ 10:47) >  NUCLEAR FINDINGS:  The left ventricle was normal in size. Normal myocardial  perfusion scan, with no evidence of infarction or  inducible ischemia.  ------------------------------------------------------------------------  GATED ANALYSIS:  Post-stress resting myocardial perfusion gated SPECT  imaging was performed (LVEF > 70%;LVEDV = 67 ml.)  ------------------------------------------------------------------------  IMPRESSIONS:Normal Study  * The left ventricle was normal in size.  * Tracer uptake was homogeneous throughout the left  ventricle.  * Normal study; no evidence for myocardial infarction or  ischemia.  * Gated wall motion analysis was performed, and shows  normal wall motion.  ------------------------------------------------------------------------  Confirmed on  11/29/2021 - 16:29:57 at [facility] by  Tori Angelo MD   < end of copied text >    < from: TTE Echo Complete w/o Contrast w/ Doppler (11.29.21 @ 08:39) >    PHYSICIAN INTERPRETATION:  Left Ventricle: The left ventricular internal cavity size is normal.  Global LV systolic function was normal. Left ventricular ejection fraction, by visual estimation, is 65 to 70%. Spectral Doppler shows normal pattern of LV diastolic filling.  Right Ventricle: Normal right ventricular size and function.  Left Atrium: The left atrium is normal in size.  Right Atrium: The right atrium is normal in size.  Pericardium: There is no evidence of pericardial effusion.  Mitral Valve: Mild thickening of the anterior and posterior mitral valve leaflets. Mild mitral valve regurgitation is seen.  Tricuspid Valve: The tricuspid valve is normal in structure. Mild tricuspid regurgitation is visualized.  Aortic Valve: The aortic valve was not well visualized. Sclerotic aortic valve with normal opening. Moderate aortic valve regurgitation is seen.  Pulmonic Valve: The pulmonic valve was not well visualized. Trace pulmonic valve regurgitation.  Aorta: The aortic root and ascending aorta are structurally normal, with no evidenceof dilitation.  Pulmonary Artery: The main pulmonary artery is normal in size.  Venous: The inferior vena cava was normal sized, with respiratory size variation greater than 50%.  In comparison to the previous echocardiogram(s): There are no prior studies on this patient for comparison purposes.    Summary:   1. Technically difficult study.   2. Normal left ventricular internal cavity size.   3. Normal global left ventricular systolic function.   4. Left ventricular ejection fraction, by visualestimation, is 65 to 70%.   5. Sclerotic aortic valve with normal opening.   6. Moderate aortic regurgitation.   7. Mild thickening of the anterior and posterior mitral valve leaflets.   8. Mild mitral valve regurgitation.   9. Mild tricuspid regurgitation.  10. There is no evidence of pericardial effusion.    MD Maryanne Electronically signed on 11/29/2021 at 11:22:11 AM    < end of copied text >

## 2021-11-29 NOTE — PROGRESS NOTE ADULT - ASSESSMENT
60 years old woman with h/o overweight (BMI 28), anxiety/depression on meds, hysterectomy and foot surgery. She has remote h/o alcohol abuse, stopped 5 years ago, and is an active smoker with > 40 ppd smoking history. She now presents with progressive PAPPAS, chest tightness and palpitation. She stated that for more than 5 months now she has been having PAPPAS and chest tightness with walking. Also has separate episodes of palpitation with rapid and irregular heart racing, that is a/w SOB. She has been having more frequent palpitation episodes, which sued to stop on their own in less than 1-2 hours. Yesterday she had a more severe and longer episode and so came to the ED. In the ED, found to have episodes of paroxysmal AFib with RVR. Started on Diltiazem 30 mg q 6 h, now up to 60 mg q 6hours.     1. New symptomatic paroxysmal AF with RVR, CHADS-VASc =1 for gender only  - TTE w/ normal LA size, LVEF 65-70%  - NST today negative for inducible ischemia  - Currently in sinus rhythm  - transition to Diltiazem XR 120mg daily (lower dose secondary to borderline sinus rates)  - start Flecainide 50mg BID. Interactions w/ seroquel & sertraline reviewed: Flecainide is unlikely to prolong QT and pt's baseline QTc is WNL.   - Start aspirin 81mg PO daily.   - Consider future ablation pending w/up of lung nodules. Pt would need to be on therapeutic a/c azra-procedurally.       2. Chest tightness  - NST today negative for inducible ischemia    3. Lung nodules  - encourage smoking cessation  - Repeat CT scan in 8 weeks as recommended for further assessment/risk stratification.     Above d/w Dr. Cates and Dr. Reza.  60 years old woman with h/o overweight (BMI 28), anxiety/depression on meds, hysterectomy and foot surgery. She has remote h/o alcohol abuse, stopped 5 years ago, and is an active smoker with > 40 ppd smoking history. She now presents with progressive PAPPAS, chest tightness and palpitation. She stated that for more than 5 months now she has been having PAPPAS and chest tightness with walking. Also has separate episodes of palpitation with rapid and irregular heart racing, that is a/w SOB. She has been having more frequent palpitation episodes, which used to stop on their own in less than 1-2 hours. Yesterday she had a more severe and longer episode and so came to the ED. In the ED, found to have episodes of paroxysmal AFib with RVR. Started on Diltiazem 30 mg q 6 h, now up to 60 mg q 6hours.     1. New symptomatic paroxysmal AF with RVR, CHADS-VASc =1 for gender only  - TTE w/ normal LA size, LVEF 65-70%  - NST today negative for inducible ischemia  - Currently in sinus rhythm  - transition to Diltiazem XR 120mg daily (lower dose secondary to borderline sinus rates)  - start Flecainide 50mg BID. Interactions w/ seroquel & sertraline reviewed: Flecainide is unlikely to prolong QT and pt's baseline QTc is WNL.   - Start aspirin 81mg PO daily.   - Consider future ablation pending w/up of lung nodules. Pt would need to be on therapeutic a/c azra-procedurally.       2. Chest tightness  - NST today negative for inducible ischemia    3. Lung nodules  - encourage smoking cessation  - Repeat CT scan in 8 weeks as recommended for further assessment/risk stratification.     Above d/w Dr. Cates and Dr. Reza.

## 2021-11-29 NOTE — PROGRESS NOTE ADULT - ASSESSMENT
59yo F with history of anxiety, depression, tobacco use complaining of intermittent palpitations, sensation of lightheadedness and malaise, found to have paroxysmal Atrial fibrillation on tele monitor      Paroxysmal atrial fibrillation      - risk of thromboembolic events in patients with pAF is lower than the risk in patients with persistent or permanent AF  -   - Echo-   59yo F with history of anxiety, depression, tobacco use complaining of intermittent palpitations, sensation of lightheadedness and malaise, found to have paroxysmal Atrial fibrillation on tele monitor     11/29: Pt denies chest pain, palpitations, SOB. Tele- . Echo-EF 65-70%, sclerotic AV with normal opening, moderate AR, mild MVR, mild TR, no evidence of pericardial effusion. NST-completed, awaiting results. As per EP consult "If no ischemia, will plan to start flecainide or propafenone...If positive CAD, and/or SHD/HF will consider AC. Maybe a good candidate for ablation if lung nodules remain stable".      Paroxysmal atrial fibrillation  - Tele-  - Echo-EF 65-70%, sclerotic AV with normal opening, moderate AR, mild MVR, mild TR, no evidence of pericardial effusion  -NST-completed, awaiting results  -As per EP consult "If no ischemia, will plan to start flecainide or propafenone...If positive CAD, and/or SHD/HF will consider AC. Maybe a good candidate for ablation if lung nodules remain stable"  -TSH-5.53, adjust synthroid as per PMT       61yo F with history of anxiety, depression, tobacco use complaining of intermittent palpitations, sensation of lightheadedness and malaise, found to have paroxysmal Atrial fibrillation on tele monitor     11/29: Pt denies chest pain, palpitations, SOB. Tele-NSR HR @ 60s, converted at 0830 from Afib with RVR . Echo-EF 65-70%, sclerotic AV with normal opening, moderate AR, mild MVR, mild TR, no evidence of pericardial effusion. NST-completed, awaiting results. As per EP consult "If no ischemia, will plan to start flecainide or propafenone...If positive CAD, and/or SHD/HF will consider AC. Maybe a good candidate for ablation if lung nodules remain stable".      Paroxysmal atrial fibrillation  - Tele-NSR HR @ 60s, converted at 0830 from Afib with RVR  - Echo-EF 65-70%, sclerotic AV with normal opening, moderate AR, mild MVR, mild TR, no evidence of pericardial effusion  -NST-completed, awaiting results  -As per EP consult "If no ischemia, will plan to start flecainide or propafenone...If positive CAD, and/or SHD/HF will consider AC. Maybe a good candidate for ablation if lung nodules remain stable"  -TSH-5.53, adjust synthroid as per PMT

## 2022-07-12 NOTE — ED PROVIDER NOTE - PROVIDER TOKENS
At-Home Dez Inclusion/Exclusion Criteria:     Study Name: Dez  : Sanjay Wiggins MD    Protocol version: 4.0 - [id number]    Criteria #  Inclusion Criterita (ALL MUST BE YES)  YES/NO/N/A   1   Male and female subjects at least 18 years old at the time of the screening visit.  Yes   2   Wrist circumference and 120mm-245mm (inclusive).  Yes   3   Ability to understand and provide written informed consent.  Yes   4   Willing and able to comply with study procedures, activities, and duration as described in the ICF.   Yes   5  If not vaccinated and up to date with COVID -19 vaccinations, willing to take a rapid AG test, or PCR test, and produce a negative result within 3 days of the study visit(s).   Yes   6   Didn't smoke at least 2 hours before screening (or study procedures).  Yes   7   Neither subject, nor any individuals living with subject, have had new development in the following within the last 14 days prior to study screening:        a. Have failed to comply with any country, state, and local travel restrictions.         b. Have had any unexpected flu-like symptoms (such as fever, chills, cough, shortness of breath, diarrhea, sore throat, runny nose, or trouble breathing).        c. Have had any contact with people confirmed COVID-19.         d. Have been confirmed to have COVID-19 and have not subsequently received a negative COVID-19 test result.    Yes   8   If Cohort 1 (In-Lab Cardiac Conditions):         a. Indication of a rhythm disorder (dated up to 5 years ago) as outlined in Table A (see Protocol page 9), and be present at the time of screening.     NA   9   If Cohort 2 (In-Lab Respiratory Conditions):          a. Prior diagnosis of one of the following conditions, within 5 years: 1) Moderate (GOLD Stage 2) COPD, 2) Severe or Very Severe (GOLD Stage 3 or 4) COPD, 3) Idiopathic pulmonary fibrosis.          b. Record of spirometry FEV% result (within 5 years) are available.    NA    10   If Cohort 3 (At-Home respiratory Conditions):         a. Prior diagnosis of one of the following conditions, within 5 years: 1) Moderate (GOLD Stage 2) COPD, 2) Severe or Very Severe (GOLD Stage 3 or 4) COPD, 3) Idiopathic pulmonary fibrosis.          b. Record of spirometry FEV% result (within 5 years) are available.          c. Willing and able to use a study provided iPhone and navigate study Anand flow.          d. Stable WIFI at home and are able to connect it to study iPhones     Yes       Criteria # Exclusion Criteria (ALL MUST BE NO) YES/NO/N/A   1   Individuals with severe contact allergies to standard adhesives, or other materials found in pulse oximetry sensors, ECG electrodes, respiration monitor electrodes, wearables device bands and watch surfaces.    No   2   Individuals that do not have at least 2 intact fingers (excluding thumb, *pinky will be excluded only for cohort 1 and cohort 2) on non-preferred hand to wear a watch.    No   3   Open wound(s) or active infections on wrists at study watch wear locations or where the ECG electrodes may be placed.    No   4   Physical disability that prevents safe and adequate testing.  No   5   Individuals with a pacemaker or an automated implantable cardioverter-defibrillator (AICD).    No   6   Individuals with physical scars, tattoos, or other skin markings on wrists where sensors or finger sensor are to be worn.    No   7   Individuals with clinically significant hand tremors, as judged by a Study Investigator.    No   8   Pregnant women.     No   9   Subjects with any medical history, physical exam, vital sign or any other study procedure finding/assessment that in the opinion of the Investigator could compromise subject safety during study participation or interfere with the study integrity and/or the accurate assessment of the study objectives.    No   10   Presence of skin conditions or disease at the fingers of SpO2% application sites that could  interfere with SpO2% sensor placement or the accuracy of measurement. Such conditions include, but are not limited to: extensive scarring, skin lesions, redness, infection or edema at target measurement sites.     No   11   Presence of long fingernails that interfere with the placement of the SpO2% sensor or nail polish at the fingers of SpO2% application sites.    No   12   Medical history or physical assessment finding that makes the subject inappropriate for participation, according to the investigator.    No     Patient does fulfill study inclusion criteria and no exclusion criteria are found.     Sanjay Wiggins MD    12-JUL-2022    Sara Behmanesh       PROVIDER:[TOKEN:[[id number]],FOLLOWUP:[1-3 Days]]

## 2024-04-05 PROBLEM — F41.9 ANXIETY DISORDER, UNSPECIFIED: Chronic | Status: ACTIVE | Noted: 2021-11-28

## 2024-04-05 PROBLEM — G47.9 SLEEP DISORDER, UNSPECIFIED: Chronic | Status: ACTIVE | Noted: 2021-11-28

## 2024-04-05 PROBLEM — E03.9 HYPOTHYROIDISM, UNSPECIFIED: Chronic | Status: ACTIVE | Noted: 2021-11-28

## 2024-04-15 ENCOUNTER — APPOINTMENT (OUTPATIENT)
Dept: DERMATOLOGY | Facility: CLINIC | Age: 64
End: 2024-04-15

## 2024-06-19 ENCOUNTER — APPOINTMENT (OUTPATIENT)
Dept: DERMATOLOGY | Facility: CLINIC | Age: 64
End: 2024-06-19

## 2024-10-08 NOTE — DISCHARGE NOTE NURSING/CASE MANAGEMENT/SOCIAL WORK - PATIENT PORTAL LINK FT
oriented to person, place and time , normal sensation , short and long term memory intact You can access the FollowMyHealth Patient Portal offered by [facility] by registering at the following website: [email or web address]. By joining [employer]’s FollowMyHealth portal, you will also be able to view your health information using other applications (apps) compatible with our system.